# Patient Record
Sex: FEMALE | Race: WHITE | Employment: FULL TIME | URBAN - METROPOLITAN AREA
[De-identification: names, ages, dates, MRNs, and addresses within clinical notes are randomized per-mention and may not be internally consistent; named-entity substitution may affect disease eponyms.]

---

## 2018-05-06 ENCOUNTER — HOSPITAL ENCOUNTER (EMERGENCY)
Age: 28
Discharge: HOME | End: 2018-05-07
Attending: EMERGENCY MEDICINE
Payer: SELF-PAY

## 2018-05-06 ENCOUNTER — APPOINTMENT (OUTPATIENT)
Dept: CT IMAGING | Age: 28
End: 2018-05-06
Attending: EMERGENCY MEDICINE
Payer: SELF-PAY

## 2018-05-06 VITALS
TEMPERATURE: 99.6 F | SYSTOLIC BLOOD PRESSURE: 120 MMHG | HEART RATE: 78 BPM | RESPIRATION RATE: 18 BRPM | DIASTOLIC BLOOD PRESSURE: 69 MMHG | OXYGEN SATURATION: 99 %

## 2018-05-06 DIAGNOSIS — R25.1 TREMOR: ICD-10-CM

## 2018-05-06 DIAGNOSIS — R41.82 ALTERED MENTAL STATUS, UNSPECIFIED ALTERED MENTAL STATUS TYPE: Primary | ICD-10-CM

## 2018-05-06 LAB
AMPHET UR QL SCN: POSITIVE
ANION GAP SERPL CALC-SCNC: 7 MMOL/L (ref 3–18)
APAP SERPL-MCNC: <2 UG/ML (ref 10–30)
APPEARANCE UR: NORMAL
BARBITURATES UR QL SCN: NEGATIVE
BASOPHILS # BLD: 0 K/UL (ref 0–0.1)
BASOPHILS NFR BLD: 0 % (ref 0–2)
BENZODIAZ UR QL: NEGATIVE
BILIRUB UR QL: NEGATIVE
BUN SERPL-MCNC: 10 MG/DL (ref 7–18)
BUN/CREAT SERPL: 15 (ref 12–20)
CALCIUM SERPL-MCNC: 8.5 MG/DL (ref 8.5–10.1)
CANNABINOIDS UR QL SCN: POSITIVE
CHLORIDE SERPL-SCNC: 108 MMOL/L (ref 100–108)
CK MB CFR SERPL CALC: 1.4 % (ref 0–4)
CK MB SERPL-MCNC: 1.4 NG/ML (ref 5–25)
CK SERPL-CCNC: 103 U/L (ref 26–192)
CO2 SERPL-SCNC: 25 MMOL/L (ref 21–32)
COCAINE UR QL SCN: NEGATIVE
COLOR UR: YELLOW
CREAT SERPL-MCNC: 0.65 MG/DL (ref 0.6–1.3)
DIFFERENTIAL METHOD BLD: ABNORMAL
EOSINOPHIL # BLD: 0.1 K/UL (ref 0–0.4)
EOSINOPHIL NFR BLD: 1 % (ref 0–5)
ERYTHROCYTE [DISTWIDTH] IN BLOOD BY AUTOMATED COUNT: 11.5 % (ref 11.6–14.5)
ETHANOL SERPL-MCNC: <3 MG/DL (ref 0–3)
GLUCOSE SERPL-MCNC: 102 MG/DL (ref 74–99)
GLUCOSE UR STRIP.AUTO-MCNC: NEGATIVE MG/DL
HCG UR QL: NEGATIVE
HCT VFR BLD AUTO: 35.6 % (ref 35–45)
HDSCOM,HDSCOM: ABNORMAL
HGB BLD-MCNC: 12.7 G/DL (ref 12–16)
HGB UR QL STRIP: NEGATIVE
KETONES UR QL STRIP.AUTO: NEGATIVE MG/DL
LEUKOCYTE ESTERASE UR QL STRIP.AUTO: NEGATIVE
LYMPHOCYTES # BLD: 2.1 K/UL (ref 0.9–3.6)
LYMPHOCYTES NFR BLD: 20 % (ref 21–52)
MCH RBC QN AUTO: 32.2 PG (ref 24–34)
MCHC RBC AUTO-ENTMCNC: 35.7 G/DL (ref 31–37)
MCV RBC AUTO: 90.4 FL (ref 74–97)
METHADONE UR QL: NEGATIVE
MONOCYTES # BLD: 0.9 K/UL (ref 0.05–1.2)
MONOCYTES NFR BLD: 9 % (ref 3–10)
NEUTS SEG # BLD: 7.6 K/UL (ref 1.8–8)
NEUTS SEG NFR BLD: 70 % (ref 40–73)
NITRITE UR QL STRIP.AUTO: NEGATIVE
OPIATES UR QL: NEGATIVE
PCP UR QL: NEGATIVE
PH UR STRIP: 5 [PH] (ref 5–8)
PLATELET # BLD AUTO: 222 K/UL (ref 135–420)
PMV BLD AUTO: 10 FL (ref 9.2–11.8)
POTASSIUM SERPL-SCNC: 4.2 MMOL/L (ref 3.5–5.5)
PROT UR STRIP-MCNC: NEGATIVE MG/DL
RBC # BLD AUTO: 3.94 M/UL (ref 4.2–5.3)
SALICYLATES SERPL-MCNC: <2.8 MG/DL (ref 2.8–20)
SODIUM SERPL-SCNC: 140 MMOL/L (ref 136–145)
SP GR UR REFRACTOMETRY: 1.02 (ref 1–1.03)
TROPONIN I SERPL-MCNC: <0.02 NG/ML (ref 0–0.04)
UROBILINOGEN UR QL STRIP.AUTO: 0.2 EU/DL (ref 0.2–1)
WBC # BLD AUTO: 10.8 K/UL (ref 4.6–13.2)

## 2018-05-06 PROCEDURE — 80307 DRUG TEST PRSMV CHEM ANLYZR: CPT | Performed by: EMERGENCY MEDICINE

## 2018-05-06 PROCEDURE — 74011250636 HC RX REV CODE- 250/636: Performed by: EMERGENCY MEDICINE

## 2018-05-06 PROCEDURE — 81003 URINALYSIS AUTO W/O SCOPE: CPT | Performed by: EMERGENCY MEDICINE

## 2018-05-06 PROCEDURE — 96374 THER/PROPH/DIAG INJ IV PUSH: CPT

## 2018-05-06 PROCEDURE — 99285 EMERGENCY DEPT VISIT HI MDM: CPT

## 2018-05-06 PROCEDURE — 80048 BASIC METABOLIC PNL TOTAL CA: CPT | Performed by: EMERGENCY MEDICINE

## 2018-05-06 PROCEDURE — 74011250636 HC RX REV CODE- 250/636

## 2018-05-06 PROCEDURE — 85025 COMPLETE CBC W/AUTO DIFF WBC: CPT | Performed by: EMERGENCY MEDICINE

## 2018-05-06 PROCEDURE — 70450 CT HEAD/BRAIN W/O DYE: CPT

## 2018-05-06 PROCEDURE — 93005 ELECTROCARDIOGRAM TRACING: CPT

## 2018-05-06 PROCEDURE — 96361 HYDRATE IV INFUSION ADD-ON: CPT

## 2018-05-06 PROCEDURE — 82550 ASSAY OF CK (CPK): CPT | Performed by: EMERGENCY MEDICINE

## 2018-05-06 PROCEDURE — 81025 URINE PREGNANCY TEST: CPT | Performed by: EMERGENCY MEDICINE

## 2018-05-06 RX ORDER — LORAZEPAM 2 MG/ML
1 INJECTION INTRAMUSCULAR
Status: COMPLETED | OUTPATIENT
Start: 2018-05-06 | End: 2018-05-06

## 2018-05-06 RX ORDER — SODIUM CHLORIDE 9 MG/ML
1000 INJECTION, SOLUTION INTRAVENOUS ONCE
Status: COMPLETED | OUTPATIENT
Start: 2018-05-06 | End: 2018-05-07

## 2018-05-06 RX ORDER — LORAZEPAM 2 MG/ML
INJECTION INTRAMUSCULAR
Status: COMPLETED
Start: 2018-05-06 | End: 2018-05-06

## 2018-05-06 RX ADMIN — LORAZEPAM 1 MG: 2 INJECTION, SOLUTION INTRAMUSCULAR; INTRAVENOUS at 20:35

## 2018-05-06 RX ADMIN — SODIUM CHLORIDE 1000 ML: 900 INJECTION, SOLUTION INTRAVENOUS at 23:19

## 2018-05-06 RX ADMIN — LORAZEPAM 1 MG: 2 INJECTION INTRAMUSCULAR at 20:35

## 2018-05-07 ENCOUNTER — APPOINTMENT (OUTPATIENT)
Dept: GENERAL RADIOLOGY | Age: 28
End: 2018-05-07
Attending: EMERGENCY MEDICINE
Payer: SELF-PAY

## 2018-05-07 LAB
ATRIAL RATE: 68 BPM
CALCULATED P AXIS, ECG09: 32 DEGREES
CALCULATED R AXIS, ECG10: 32 DEGREES
CALCULATED T AXIS, ECG11: 36 DEGREES
DIAGNOSIS, 93000: NORMAL
P-R INTERVAL, ECG05: 110 MS
Q-T INTERVAL, ECG07: 394 MS
QRS DURATION, ECG06: 100 MS
QTC CALCULATION (BEZET), ECG08: 418 MS
VENTRICULAR RATE, ECG03: 68 BPM

## 2018-05-07 PROCEDURE — 74011250637 HC RX REV CODE- 250/637: Performed by: EMERGENCY MEDICINE

## 2018-05-07 PROCEDURE — 71045 X-RAY EXAM CHEST 1 VIEW: CPT

## 2018-05-07 PROCEDURE — 94640 AIRWAY INHALATION TREATMENT: CPT

## 2018-05-07 RX ORDER — ALBUTEROL SULFATE 90 UG/1
1-2 AEROSOL, METERED RESPIRATORY (INHALATION)
Qty: 1 INHALER | Refills: 0 | Status: SHIPPED | OUTPATIENT
Start: 2018-05-07 | End: 2020-01-19

## 2018-05-07 RX ORDER — ALBUTEROL SULFATE 90 UG/1
2 AEROSOL, METERED RESPIRATORY (INHALATION)
Status: COMPLETED | OUTPATIENT
Start: 2018-05-07 | End: 2018-05-07

## 2018-05-07 RX ORDER — LORAZEPAM 0.5 MG/1
0.5 TABLET ORAL
Qty: 10 TAB | Refills: 0 | Status: SHIPPED | OUTPATIENT
Start: 2018-05-07 | End: 2018-09-23

## 2018-05-07 RX ADMIN — ALBUTEROL SULFATE 2 PUFF: 90 AEROSOL, METERED RESPIRATORY (INHALATION) at 00:54

## 2018-05-07 NOTE — ED NOTES
Pt sleeping on stretcher with no acute distress noted. Monitors reapplied. Mother at bedside. Mother expressed no needs at this time. Even rise and fall of pt's chest noted. Will continue to monitor pt and await further orders.

## 2018-05-07 NOTE — ED PROVIDER NOTES
EMERGENCY DEPARTMENT HISTORY AND PHYSICAL EXAM    8:31 PM      Date: 5/6/2018  Patient Name: Bryson Love    History of Presenting Illness     Chief Complaint   Patient presents with    Altered mental status         History Provided By: pt's boyfriend and mother    Chief Complaint: Tremors  Duration:  1 hour  Timing: intermittent  Location: Global  Quality: \"conscious jerking motions\"  Severity: N/a  Modifying Factors: pt takes propanolol and her tremors get worse if she doesn't take it  Associated Symptoms: dizziness      Additional History (Context): Bryson Love is a 28 y.o. female with anxiety who presents with intermittent global tremors for the past hour. Per the pt's boyfriend and mother, the pt complained of swollen lymph nodes, feeling warm, and dizziness prior to the tremors. The tremors were described as \"jerking motions\" by the mother, and she was conscious during the episodes. The pt missed her dose of propanolol. From past experience, if the pt misses a dose her tremors get worse. Per the boyfriend, the pt is not pregnant, doesn't use drugs, and didn't completely pass out during the episodes. Both family members report that within last 15 minutes she has became less coherent. No other concerns, modifying factors, or symptoms were expressed by the pt at this time. PCP: Madiha De La Rosa MD    Current Outpatient Prescriptions   Medication Sig Dispense Refill    albuterol (PROVENTIL HFA, VENTOLIN HFA, PROAIR HFA) 90 mcg/actuation inhaler Take 1-2 Puffs by inhalation every four (4) hours as needed for Wheezing. 1 Inhaler 0    LORazepam (ATIVAN) 0.5 mg tablet Take 1 Tab by mouth every eight (8) hours as needed for Anxiety. Max Daily Amount: 1.5 mg. 10 Tab 0    primidone (MYSOLINE) 50 mg tablet Take 100 mg by mouth nightly.  ondansetron (ZOFRAN ODT) 4 mg disintegrating tablet Take 1 Tab by mouth every eight (8) hours as needed for Nausea.  14 Tab 0    HYDROcodone-acetaminophen (NORCO) 5-325 mg per tablet Take 1-2 Tabs by mouth every six (6) hours as needed for Pain. Max Daily Amount: 8 Tabs. 16 Tab 0    sertraline (ZOLOFT) 100 mg tablet Take 100 mg by mouth daily. Indications: ANXIETY WITH DEPRESSION      dextroamphetamine-amphetamine (ADDERALL) 30 mg tablet Take 15 mg by mouth two (2) times a day. Indications: ATTENTION-DEFICIT HYPERACTIVITY DISORDER      ALPRAZolam (XANAX) 0.5 mg tablet Take 0.5 mg by mouth nightly as needed for Anxiety. Indications: ANXIETY WITH DEPRESSION         Past History     Past Medical History:  History reviewed. No pertinent past medical history. Past Surgical History:  Past Surgical History:   Procedure Laterality Date    HX OOPHORECTOMY         Family History:  History reviewed. No pertinent family history. Social History:  Social History   Substance Use Topics    Smoking status: Never Smoker    Smokeless tobacco: None    Alcohol use No       Allergies:  No Known Allergies      Review of Systems     Review of Systems   Unable to perform ROS: Mental status change         Physical Exam     Visit Vitals    /69    Pulse 78    Temp 99.6 °F (37.6 °C)    Resp 18    SpO2 99%     Physical Exam   Constitutional: She is oriented to person, place, and time. She appears well-developed. HENT:   Head: Normocephalic. Mouth/Throat: Oropharynx is clear and moist.   Eyes: Pupils are equal, round, and reactive to light. Neck: Normal range of motion. Cardiovascular: Normal rate and normal heart sounds. No murmur heard. Pulmonary/Chest: Effort normal. She has no wheezes. She has no rales. Abdominal: Soft. There is no tenderness. Musculoskeletal: Normal range of motion. She exhibits no edema. Neurological: She is alert and oriented to person, place, and time. Pt responsive to sternal rub and a few questions. Pt failed hand drop test, and was verbal during tremor episode after a sternal rub. Skin: Skin is warm and dry.    Nursing note and vitals reviewed. Diagnostic Study Results     Vitals:  No data found. Medications ordered:   Medications   LORazepam (ATIVAN) injection 1 mg (1 mg IntraVENous Given 5/6/18 2035)   0.9% sodium chloride infusion 1,000 mL (0 mL IntraVENous IV Completed 5/7/18 0033)   albuterol (PROVENTIL HFA, VENTOLIN HFA, PROAIR HFA) inhaler 2 Puff (2 Puffs Inhalation Given 5/7/18 0054)         Lab findings:  No results found for this or any previous visit (from the past 12 hour(s)). X-Ray, CT or other radiology findings or impressions:  CT HEAD WO CONT   Final Result   Impression: No acute intracranial pathology. Progress notes, Consult notes or additional Procedure notes:     12:33 AM Consult: I discussed care with Dr. Carla Blandon, Neurology. It was a standard discussion including patient history, chief complaint, available diagnostic results, and predicted treatment course. He agrees with plans to discharge the pt. Agrees sx's not c/w sz's, doesn't rec admit or any further testing at this time. 1:07 AM Progress note: Pt refuses further treatment and feels fine at this time. She requests discharge, says feels much better. Pt and family chronic recurrent sim tremor episodes. No change. Af, nl vitals. To dc per pt request.  No emc found. Pt a and o x 3, declining further ed obs. Pt verb und of det ret inst given. Disposition:  Diagnosis:   1. Altered mental status, unspecified altered mental status type    2.  Tremor        Disposition: Discharged    Follow-up Information     Follow up With Details Comments Contact Info    Tavia Mantilla MD Schedule an appointment as soon as possible for a visit in 2 days  [address] [facility] Schedule an appointment as soon as possible for a visit  Trent Sahu 3  [address]  [phone]           Discharge Medication List as of 5/7/2018 12:48 AM      START taking these medications    Details   albuterol (PROVENTIL HFA, VENTOLIN HFA, PROAIR HFA) 90 mcg/actuation inhaler Take 1-2 Puffs by inhalation every four (4) hours as needed for Wheezing., Print, Disp-1 Inhaler, R-0         CONTINUE these medications which have CHANGED    Details   LORazepam (ATIVAN) 0.5 mg tablet Take 1 Tab by mouth every eight (8) hours as needed for Anxiety. Max Daily Amount: 1.5 mg., Print, Disp-10 Tab, R-0         CONTINUE these medications which have NOT CHANGED    Details   primidone (MYSOLINE) 50 mg tablet Take 100 mg by mouth nightly., Historical Med      ondansetron (ZOFRAN ODT) 4 mg disintegrating tablet Take 1 Tab by mouth every eight (8) hours as needed for Nausea. , Print, Disp-14 Tab, R-0      HYDROcodone-acetaminophen (NORCO) 5-325 mg per tablet Take 1-2 Tabs by mouth every six (6) hours as needed for Pain. Max Daily Amount: 8 Tabs., Print, Disp-16 Tab, R-0      sertraline (ZOLOFT) 100 mg tablet Take 100 mg by mouth daily. Indications: ANXIETY WITH DEPRESSION, Historical Med      dextroamphetamine-amphetamine (ADDERALL) 30 mg tablet Take 15 mg by mouth two (2) times a day. Indications: ATTENTION-DEFICIT HYPERACTIVITY DISORDER, Historical Med      ALPRAZolam (XANAX) 0.5 mg tablet Take 0.5 mg by mouth nightly as needed for Anxiety. Indications: ANXIETY WITH DEPRESSION, Historical Med           Scribe Attestation     Tete Pearce acting as a scribe for and in the presence of Hawk Law MD      May 06, 2018 at 8:31 PM       Provider Attestation:      I personally performed the services described in the documentation, reviewed the documentation, as recorded by the scribe in my presence, and it accurately and completely records my words and actions.  May 06, 2018 at 8:31 PM - Hawk Law MD

## 2018-05-07 NOTE — ED TRIAGE NOTES
Pt arrived with mother and friend at side with reports of a witnessed seizure at home. Pt's mother states about half way here pt became unresponsive and would no longer communicate. Pt has a history of seizures and asthma.

## 2018-05-07 NOTE — ED NOTES
Pt started having jerking like movements with hypersalivation. MD Mayra Tovar notified. Pt able to talk and control arm movement during episode. IV Ativan verbal order given.

## 2018-05-07 NOTE — ED NOTES
Pt resting on stretcher sleeping on and off with mother at bedside. Pt reports limbs still feel heavy but are feeling better.

## 2018-05-07 NOTE — ED NOTES
I have reviewed discharge instructions with patient. The patient verbalized understanding. Patient armband removed and shredded. No acute distress noted at this time, pt alert and oriented. Stable at time of discharge. Vital signs stable. Pt is being discharged with 2 prescriptions at this time. Pt wheeled to lobby stating her limbs still felt very heavy. Pt assisted to vehicle where friend was driving.

## 2018-05-07 NOTE — ED NOTES
Pt placed on bed pan and produced a large amount of urine. Pt states her arms and legs feel to heavy to move.

## 2018-05-07 NOTE — ED NOTES
Pt arrived through triage, family states that she was complaining about her lymphnodes being swollen in her head and neck earlier today, stated she felt dizzy and wanted to lay down, pt went to lay down and family states she became more lethargic and less responsive, about 20 minutes ago pt was alert and oriented, upon arrival pt is arousable but just groans and shakes her head when asked questions at this time.

## 2018-09-07 ENCOUNTER — HOSPITAL ENCOUNTER (EMERGENCY)
Age: 28
Discharge: HOME | End: 2018-09-07
Attending: EMERGENCY MEDICINE
Payer: MEDICAID

## 2018-09-07 VITALS
DIASTOLIC BLOOD PRESSURE: 62 MMHG | SYSTOLIC BLOOD PRESSURE: 110 MMHG | TEMPERATURE: 99.3 F | WEIGHT: 140 LBS | BODY MASS INDEX: 22.5 KG/M2 | OXYGEN SATURATION: 98 % | HEART RATE: 96 BPM | RESPIRATION RATE: 18 BRPM | HEIGHT: 66 IN

## 2018-09-07 DIAGNOSIS — R42 LIGHTHEADEDNESS: ICD-10-CM

## 2018-09-07 DIAGNOSIS — E86.0 DEHYDRATION: Primary | ICD-10-CM

## 2018-09-07 LAB
ALBUMIN SERPL-MCNC: 4.2 G/DL (ref 3.4–5)
ALBUMIN/GLOB SERPL: 1.2 {RATIO} (ref 0.8–1.7)
ALP SERPL-CCNC: 58 U/L (ref 45–117)
ALT SERPL-CCNC: 21 U/L (ref 13–56)
ANION GAP SERPL CALC-SCNC: 9 MMOL/L (ref 3–18)
APPEARANCE UR: CLEAR
AST SERPL-CCNC: 17 U/L (ref 15–37)
BASOPHILS # BLD: 0 K/UL (ref 0–0.1)
BASOPHILS NFR BLD: 0 % (ref 0–2)
BILIRUB SERPL-MCNC: 0.4 MG/DL (ref 0.2–1)
BILIRUB UR QL: NEGATIVE
BUN SERPL-MCNC: 12 MG/DL (ref 7–18)
BUN/CREAT SERPL: 16 (ref 12–20)
CALCIUM SERPL-MCNC: 9.5 MG/DL (ref 8.5–10.1)
CHLORIDE SERPL-SCNC: 103 MMOL/L (ref 100–108)
CO2 SERPL-SCNC: 29 MMOL/L (ref 21–32)
COLOR UR: YELLOW
CREAT SERPL-MCNC: 0.74 MG/DL (ref 0.6–1.3)
DIFFERENTIAL METHOD BLD: ABNORMAL
EOSINOPHIL # BLD: 0.1 K/UL (ref 0–0.4)
EOSINOPHIL NFR BLD: 1 % (ref 0–5)
EPITH CASTS URNS QL MICRO: NORMAL /LPF (ref 0–5)
ERYTHROCYTE [DISTWIDTH] IN BLOOD BY AUTOMATED COUNT: 11.2 % (ref 11.6–14.5)
GLOBULIN SER CALC-MCNC: 3.4 G/DL (ref 2–4)
GLUCOSE SERPL-MCNC: 89 MG/DL (ref 74–99)
GLUCOSE UR STRIP.AUTO-MCNC: NEGATIVE MG/DL
HCG UR QL: NEGATIVE
HCT VFR BLD AUTO: 40 % (ref 35–45)
HGB BLD-MCNC: 13.8 G/DL (ref 12–16)
HGB UR QL STRIP: ABNORMAL
KETONES UR QL STRIP.AUTO: NEGATIVE MG/DL
LEUKOCYTE ESTERASE UR QL STRIP.AUTO: NEGATIVE
LYMPHOCYTES # BLD: 2.8 K/UL (ref 0.9–3.6)
LYMPHOCYTES NFR BLD: 30 % (ref 21–52)
MCH RBC QN AUTO: 33.3 PG (ref 24–34)
MCHC RBC AUTO-ENTMCNC: 34.5 G/DL (ref 31–37)
MCV RBC AUTO: 96.6 FL (ref 74–97)
MONOCYTES # BLD: 0.9 K/UL (ref 0.05–1.2)
MONOCYTES NFR BLD: 10 % (ref 3–10)
NEUTS SEG # BLD: 5.5 K/UL (ref 1.8–8)
NEUTS SEG NFR BLD: 59 % (ref 40–73)
NITRITE UR QL STRIP.AUTO: NEGATIVE
PH UR STRIP: 5 [PH] (ref 5–8)
PLATELET # BLD AUTO: 228 K/UL (ref 135–420)
PMV BLD AUTO: 9.8 FL (ref 9.2–11.8)
POTASSIUM SERPL-SCNC: 3.7 MMOL/L (ref 3.5–5.5)
PROT SERPL-MCNC: 7.6 G/DL (ref 6.4–8.2)
PROT UR STRIP-MCNC: NEGATIVE MG/DL
RBC # BLD AUTO: 4.14 M/UL (ref 4.2–5.3)
RBC #/AREA URNS HPF: NORMAL /HPF (ref 0–5)
SODIUM SERPL-SCNC: 141 MMOL/L (ref 136–145)
SP GR UR REFRACTOMETRY: 1.02 (ref 1–1.03)
UROBILINOGEN UR QL STRIP.AUTO: 0.2 EU/DL (ref 0.2–1)
WBC # BLD AUTO: 9.4 K/UL (ref 4.6–13.2)
WBC URNS QL MICRO: NORMAL /HPF (ref 0–4)

## 2018-09-07 PROCEDURE — 74011250636 HC RX REV CODE- 250/636: Performed by: PHYSICIAN ASSISTANT

## 2018-09-07 PROCEDURE — 96374 THER/PROPH/DIAG INJ IV PUSH: CPT

## 2018-09-07 PROCEDURE — 81001 URINALYSIS AUTO W/SCOPE: CPT | Performed by: PHYSICIAN ASSISTANT

## 2018-09-07 PROCEDURE — 74011250636 HC RX REV CODE- 250/636: Performed by: EMERGENCY MEDICINE

## 2018-09-07 PROCEDURE — 96361 HYDRATE IV INFUSION ADD-ON: CPT

## 2018-09-07 PROCEDURE — 85025 COMPLETE CBC W/AUTO DIFF WBC: CPT | Performed by: PHYSICIAN ASSISTANT

## 2018-09-07 PROCEDURE — 80053 COMPREHEN METABOLIC PANEL: CPT | Performed by: PHYSICIAN ASSISTANT

## 2018-09-07 PROCEDURE — 81025 URINE PREGNANCY TEST: CPT | Performed by: PHYSICIAN ASSISTANT

## 2018-09-07 PROCEDURE — 99283 EMERGENCY DEPT VISIT LOW MDM: CPT

## 2018-09-07 RX ORDER — ONDANSETRON 4 MG/1
4 TABLET, ORALLY DISINTEGRATING ORAL
Qty: 10 TAB | Refills: 0 | Status: SHIPPED | OUTPATIENT
Start: 2018-09-07 | End: 2020-01-19

## 2018-09-07 RX ORDER — ONDANSETRON 2 MG/ML
4 INJECTION INTRAMUSCULAR; INTRAVENOUS
Status: COMPLETED | OUTPATIENT
Start: 2018-09-07 | End: 2018-09-07

## 2018-09-07 RX ORDER — LEVETIRACETAM 250 MG/1
200 TABLET ORAL 2 TIMES DAILY
COMMUNITY
End: 2020-01-19

## 2018-09-07 RX ADMIN — ONDANSETRON 4 MG: 2 INJECTION INTRAMUSCULAR; INTRAVENOUS at 21:58

## 2018-09-07 RX ADMIN — SODIUM CHLORIDE 1000 ML: 900 INJECTION, SOLUTION INTRAVENOUS at 21:05

## 2018-09-07 NOTE — ED NOTES
I performed a brief evaluation, including history and physical, of the patient here in triage and I have determined that pt will need further treatment and evaluation from the main side ER physician. I have placed initial orders to help in expediting patients care. September 07, 2018 at 7:14 PM - Memory JON Suárez Visit Vitals  /79 (BP 1 Location: Left arm, BP Patient Position: At rest)  Pulse 96  Temp 99.3 °F (37.4 °C)  Resp 18  Ht 5' 6\" (1.676 m)  Wt 63.5 kg (140 lb)  SpO2 99%  BMI 22.6 kg/m2

## 2018-09-07 NOTE — ED TRIAGE NOTES
Patient states she has been having diarrhea x 3 days. Feels nauseous and shaky. Feels dehydrated. Has discoloration of bottom of feet (turning yellow) and feels \"confused\"

## 2018-09-08 NOTE — DISCHARGE INSTRUCTIONS
Oral Rehydration: Care Instructions  Your Care Instructions    Dehydration occurs when your body loses too much water. This can happen if you do not drink enough fluids or lose a lot of fluid due to diarrhea, vomiting, or sweating. Being dehydrated can cause health problems and can even be life-threatening. To replace lost fluids, you need to drink liquid that contains special chemicals called electrolytes. Electrolytes keep your body working well. Plain water does not have electrolytes. You also need to rest to prevent more fluid loss. Replacing water and electrolytes (oral rehydration) completely takes about 36 hours. But you should feel better within a few hours. Follow-up care is a key part of your treatment and safety. Be sure to make and go to all appointments, and call your doctor if you are having problems. It's also a good idea to know your test results and keep a list of the medicines you take. How can you care for yourself at home? · Take frequent sips of a drink such as Gatorade, Powerade, or other rehydration drinks that your doctor suggests. These replace both fluid and important chemicals (electrolytes) you need for balance in your blood. · Drink 2 quarts of cool liquid over 2 to 4 hours. You should have at least 10 glasses of liquid a day to replace lost fluid. If you have kidney, heart, or liver disease and have to limit fluids, talk with your doctor before you increase the amount of fluids you drink. · Make your own drink. Measure everything carefully. The drink may not work well or may even be harmful if the amounts are off. ¨ 1 quart water  ¨ ½ teaspoon salt  ¨ 6 teaspoons sugar  · Do not drink liquid with caffeine, such as coffee and usama. · Do not drink any alcohol. It can make you dehydrated. · Drink plenty of fluids, enough so that your urine is light yellow or clear like water.  If you have kidney, heart, or liver disease and have to limit fluids, talk with your doctor before you increase the amount of fluids you drink. When should you call for help? Call 911 anytime you think you may need emergency care. For example, call if:    · You have signs of severe dehydration, such as:  ¨ You are confused or unable to stay awake. ¨ You passed out (lost consciousness).    Call your doctor now or seek immediate medical care if:    · You still have signs of dehydration. You have sunken eyes and a dry mouth, and you pass only a little dark urine.     · You are dizzy or lightheaded, or you feel like you may faint.     · You are not able to keep down fluids.    Watch closely for changes in your health, and be sure to contact your doctor if:    · You do not get better as expected. Where can you learn more? Go to http://corky-chiquita.info/. Enter I040 in the search box to learn more about \"Oral Rehydration: Care Instructions. \"  Current as of: November 20, 2017  Content Version: 11.7  © 0072-2859 [employer]. Care instructions adapted under license by [employer] (which disclaims liability or warranty for this information). If you have questions about a medical condition or this instruction, always ask your healthcare professional. [address] any warranty or liability for your use of this information.

## 2018-09-08 NOTE — ED PROVIDER NOTES
EMERGENCY DEPARTMENT HISTORY AND PHYSICAL EXAM 
 
8:57 PM 
 
 
Date: 9/7/2018 Patient Name: Estefany Unger History of Presenting Illness Chief Complaint Patient presents with  Dehydration  Diarrhea  Nausea  Skin Problem History Provided By: Patient Chief Complaint: Fatigue Duration:  Days Timing:  Gradual and Worsening Location: Generalized Severity: Moderate Modifying Factors: Made worse with continued diarrhea. Associated Symptoms: nausea, vomiting x1, diarrhea, lower abdomial pain, confusion, lightheadedness, myalgias Additional History (Context): Estefany Unger is a 28 y.o. female with a history of asthma, seizures, and oophorectomy, who presents to the ED with complaint of gradually worsening generalized weakness over the past 3 days. Patient states that she has been having persistent diarrhea, nausea, vomiting x1, and lightheadedness over the past three days. She states that she feels dehydrated. Patient reports that her symptoms have progressed and she now feels intermittently confused and has lower abdominal pain and BLE myalgias. She notes that she is a  and has been sweating a lot more than usual. Patient states \"I drink a lot of fluids but I don't think I am getting enough. \" Patient denies recent sick contact. She notes LMP was last week. She denies associated fever, chills, chest pain, shortness of breath, or headache. Patient makes no further complaints. PCP: Shelia Mosquera MD 
 
Current Outpatient Prescriptions Medication Sig Dispense Refill  levETIRAcetam (KEPPRA) 250 mg tablet Take 200 mg by mouth two (2) times a day.  ondansetron (ZOFRAN ODT) 4 mg disintegrating tablet Take 1 Tab by mouth every eight (8) hours as needed for Nausea. 10 Tab 0  
 albuterol (PROVENTIL HFA, VENTOLIN HFA, PROAIR HFA) 90 mcg/actuation inhaler Take 1-2 Puffs by inhalation every four (4) hours as needed for Wheezing.  1 Inhaler 0  
  LORazepam (ATIVAN) 0.5 mg tablet Take 1 Tab by mouth every eight (8) hours as needed for Anxiety. Max Daily Amount: 1.5 mg. 10 Tab 0  primidone (MYSOLINE) 50 mg tablet Take 100 mg by mouth nightly.  HYDROcodone-acetaminophen (NORCO) 5-325 mg per tablet Take 1-2 Tabs by mouth every six (6) hours as needed for Pain. Max Daily Amount: 8 Tabs. 16 Tab 0  
 sertraline (ZOLOFT) 100 mg tablet Take 100 mg by mouth daily. Indications: ANXIETY WITH DEPRESSION    
 dextroamphetamine-amphetamine (ADDERALL) 30 mg tablet Take 15 mg by mouth two (2) times a day. Indications: ATTENTION-DEFICIT HYPERACTIVITY DISORDER  ALPRAZolam (XANAX) 0.5 mg tablet Take 0.5 mg by mouth nightly as needed for Anxiety. Indications: ANXIETY WITH DEPRESSION Past History Past Medical History: 
Past Medical History:  
Diagnosis Date  Asthma  Seizure ([facility] [address] Past Surgical History: 
Past Surgical History:  
Procedure Laterality Date  HX  SECTION    
 HX OOPHORECTOMY Family History: 
History reviewed. No pertinent family history. Social History: 
Social History Substance Use Topics  Smoking status: Never Smoker  Smokeless tobacco: Never Used  Alcohol use No  
 
 
Allergies: 
No Known Allergies Review of Systems Review of Systems Constitutional: Positive for fatigue. Negative for chills and fever. HENT: Negative for congestion, ear pain, rhinorrhea and sore throat. Eyes: Negative for pain, redness and itching. Respiratory: Negative for cough, chest tightness and shortness of breath. Cardiovascular: Negative for chest pain, palpitations and leg swelling. Gastrointestinal: Positive for abdominal pain (lower), diarrhea, nausea and vomiting (x1). Genitourinary: Negative for decreased urine volume, dysuria, flank pain, hematuria and pelvic pain. Musculoskeletal: Negative for arthralgias, back pain, joint swelling and myalgias. Skin: Negative for color change, pallor and rash. Neurological: Positive for light-headedness. Negative for dizziness, weakness and headaches. Hematological: Negative for adenopathy. Does not bruise/bleed easily. Psychiatric/Behavioral: Positive for confusion. Physical Exam  
 
Visit Vitals  /62  Pulse 96  Temp 99.3 °F (37.4 °C)  Resp 18  Ht 5' 6\" (1.676 m)  Wt 63.5 kg (140 lb)  LMP 09/02/2018  SpO2 100%  BMI 22.6 kg/m2 Physical Exam  
Constitutional: No distress. HENT:  
Head: Normocephalic and atraumatic. Mouth/Throat: Oropharynx is clear and moist. Mucous membranes are dry. Eyes: Conjunctivae and EOM are normal. Pupils are equal, round, and reactive to light. Neck: Normal range of motion. Neck supple. Cardiovascular: Normal rate, regular rhythm and normal heart sounds. No murmur heard. Pulmonary/Chest: Effort normal and breath sounds normal. She has no wheezes. She has no rales. Abdominal: Soft. Bowel sounds are normal. She exhibits no distension. There is no tenderness. Musculoskeletal: Normal range of motion. She exhibits no edema or deformity. Lymphadenopathy:  
  She has no cervical adenopathy. Neurological: She is alert. She exhibits normal muscle tone. Coordination normal.  
Skin: Skin is warm and dry. No rash noted. She is not diaphoretic. No erythema. Psychiatric: She has a normal mood and affect. Her behavior is normal.  
 
 
 
Diagnostic Study Results Labs - Recent Results (from the past 12 hour(s)) URINALYSIS W/ RFLX MICROSCOPIC Collection Time: 09/07/18  7:17 PM  
Result Value Ref Range Color YELLOW Appearance CLEAR Specific gravity 1.016 1.005 - 1.030    
 pH (UA) 5.0 5.0 - 8.0 Protein NEGATIVE  NEG mg/dL Glucose NEGATIVE  NEG mg/dL Ketone NEGATIVE  NEG mg/dL Bilirubin NEGATIVE  NEG Blood TRACE (A) NEG Urobilinogen 0.2 0.2 - 1.0 EU/dL  Nitrites NEGATIVE  NEG    
 Leukocyte Esterase NEGATIVE  NEG    
HCG URINE, QL Collection Time: 09/07/18  7:17 PM  
Result Value Ref Range HCG urine, QL NEGATIVE  NEG    
URINE MICROSCOPIC ONLY Collection Time: 09/07/18  7:17 PM  
Result Value Ref Range WBC 0 to 3 0 - 4 /hpf  
 RBC 0 to 3 0 - 5 /hpf Epithelial cells 1+ 0 - 5 /lpf  
CBC WITH AUTOMATED DIFF Collection Time: 09/07/18  8:03 PM  
Result Value Ref Range WBC 9.4 4.6 - 13.2 K/uL  
 RBC 4.14 (L) 4.20 - 5.30 M/uL  
 HGB 13.8 12.0 - 16.0 g/dL HCT 40.0 35.0 - 45.0 % MCV 96.6 74.0 - 97.0 FL  
 MCH 33.3 24.0 - 34.0 PG  
 MCHC 34.5 31.0 - 37.0 g/dL  
 RDW 11.2 (L) 11.6 - 14.5 % PLATELET 133 424 - 799 K/uL MPV 9.8 9.2 - 11.8 FL  
 NEUTROPHILS 59 40 - 73 % LYMPHOCYTES 30 21 - 52 % MONOCYTES 10 3 - 10 % EOSINOPHILS 1 0 - 5 % BASOPHILS 0 0 - 2 %  
 ABS. NEUTROPHILS 5.5 1.8 - 8.0 K/UL  
 ABS. LYMPHOCYTES 2.8 0.9 - 3.6 K/UL  
 ABS. MONOCYTES 0.9 0.05 - 1.2 K/UL  
 ABS. EOSINOPHILS 0.1 0.0 - 0.4 K/UL  
 ABS. BASOPHILS 0.0 0.0 - 0.1 K/UL  
 DF AUTOMATED METABOLIC PANEL, COMPREHENSIVE Collection Time: 09/07/18  8:03 PM  
Result Value Ref Range Sodium 141 136 - 145 mmol/L Potassium 3.7 3.5 - 5.5 mmol/L Chloride 103 100 - 108 mmol/L  
 CO2 29 21 - 32 mmol/L Anion gap 9 3.0 - 18 mmol/L Glucose 89 74 - 99 mg/dL BUN 12 7.0 - 18 MG/DL Creatinine 0.74 0.6 - 1.3 MG/DL  
 BUN/Creatinine ratio 16 12 - 20 GFR est AA >60 >60 ml/min/1.73m2 GFR est non-AA >60 >60 ml/min/1.73m2 Calcium 9.5 8.5 - 10.1 MG/DL Bilirubin, total 0.4 0.2 - 1.0 MG/DL  
 ALT (SGPT) 21 13 - 56 U/L  
 AST (SGOT) 17 15 - 37 U/L Alk. phosphatase 58 45 - 117 U/L Protein, total 7.6 6.4 - 8.2 g/dL Albumin 4.2 3.4 - 5.0 g/dL Globulin 3.4 2.0 - 4.0 g/dL A-G Ratio 1.2 0.8 - 1.7 Radiologic Studies - No orders to display Medical Decision Making I am the first provider for this patient. I reviewed the vital signs, available nursing notes, past medical history, past surgical history, family history and social history. Vital Signs-Reviewed the patient's vital signs. Records Reviewed: Nursing Notes (Time of Review: 8:57 PM) ED Course: Progress Notes, Reevaluation, and Consults: 
 
11:20 PM Patient feeling much better after IVF, tolerating PO fluids, ambulating with steady gait. Will rx antiemetic, primary care f/u. Diagnosis Clinical Impression: 1. Dehydration 2. Lightheadedness Disposition: Discharge Follow-up Information Follow up With Details Comments Contact Info Nicci Hernandez MD In 3 days  [facility] [address] [address] 
[phone] [id number] [facility] EMERGENCY DEPT  If symptoms worsen [address] [id number] [phone] Patient's Medications Start Taking No medications on file Continue Taking ALBUTEROL (PROVENTIL HFA, VENTOLIN HFA, PROAIR HFA) 90 MCG/ACTUATION INHALER    Take 1-2 Puffs by inhalation every four (4) hours as needed for Wheezing. ALPRAZOLAM (XANAX) 0.5 MG TABLET    Take 0.5 mg by mouth nightly as needed for Anxiety. Indications: ANXIETY WITH DEPRESSION  
 DEXTROAMPHETAMINE-AMPHETAMINE (ADDERALL) 30 MG TABLET    Take 15 mg by mouth two (2) times a day. Indications: ATTENTION-DEFICIT HYPERACTIVITY DISORDER HYDROCODONE-ACETAMINOPHEN (NORCO) 5-325 MG PER TABLET    Take 1-2 Tabs by mouth every six (6) hours as needed for Pain. Max Daily Amount: 8 Tabs. LEVETIRACETAM (KEPPRA) 250 MG TABLET    Take 200 mg by mouth two (2) times a day. LORAZEPAM (ATIVAN) 0.5 MG TABLET    Take 1 Tab by mouth every eight (8) hours as needed for Anxiety. Max Daily Amount: 1.5 mg. PRIMIDONE (MYSOLINE) 50 MG TABLET    Take 100 mg by mouth nightly. SERTRALINE (ZOLOFT) 100 MG TABLET    Take 100 mg by mouth daily. Indications: ANXIETY WITH DEPRESSION These Medications have changed Modified Medication Previous Medication ONDANSETRON (ZOFRAN ODT) 4 MG DISINTEGRATING TABLET ondansetron (ZOFRAN ODT) 4 mg disintegrating tablet Take 1 Tab by mouth every eight (8) hours as needed for Nausea. Take 1 Tab by mouth every eight (8) hours as needed for Nausea. Stop Taking No medications on file  
 
_______________________________ Scribe Attestation:    
Arron Alonzo, acting as a scribe for and in the presence of Zulema Hutton MD     
September 07, 2018 at 8:57 PM 
    
Provider Attestation:     
I personally performed the services described in the documentation, reviewed the documentation, as recorded by the scribe in my presence, and it accurately and completely records my words and actions. September 07, 2018 at 8:57 PM - Zulema Hutton MD   
 
_______________________________

## 2018-09-23 ENCOUNTER — HOSPITAL ENCOUNTER (EMERGENCY)
Age: 28
Discharge: HOME | End: 2018-09-24
Attending: EMERGENCY MEDICINE
Payer: COMMERCIAL

## 2018-09-23 ENCOUNTER — APPOINTMENT (OUTPATIENT)
Dept: GENERAL RADIOLOGY | Age: 28
End: 2018-09-23
Attending: NURSE PRACTITIONER
Payer: COMMERCIAL

## 2018-09-23 DIAGNOSIS — F41.1 ANXIETY STATE: Primary | ICD-10-CM

## 2018-09-23 LAB
ALBUMIN SERPL-MCNC: 4.3 G/DL (ref 3.4–5)
ALBUMIN/GLOB SERPL: 1.1 {RATIO} (ref 0.8–1.7)
ALP SERPL-CCNC: 65 U/L (ref 45–117)
ALT SERPL-CCNC: 20 U/L (ref 13–56)
AMPHET UR QL SCN: POSITIVE
ANION GAP SERPL CALC-SCNC: 8 MMOL/L (ref 3–18)
APPEARANCE UR: CLEAR
AST SERPL-CCNC: 12 U/L (ref 15–37)
BACTERIA URNS QL MICRO: NEGATIVE /HPF
BARBITURATES UR QL SCN: NEGATIVE
BASOPHILS # BLD: 0 K/UL (ref 0–0.1)
BASOPHILS NFR BLD: 0 % (ref 0–2)
BENZODIAZ UR QL: NEGATIVE
BILIRUB SERPL-MCNC: 0.6 MG/DL (ref 0.2–1)
BILIRUB UR QL: NEGATIVE
BUN SERPL-MCNC: 11 MG/DL (ref 7–18)
BUN/CREAT SERPL: 17 (ref 12–20)
CALCIUM SERPL-MCNC: 9 MG/DL (ref 8.5–10.1)
CANNABINOIDS UR QL SCN: POSITIVE
CHLORIDE SERPL-SCNC: 107 MMOL/L (ref 100–108)
CO2 SERPL-SCNC: 25 MMOL/L (ref 21–32)
COCAINE UR QL SCN: NEGATIVE
COLOR UR: YELLOW
CREAT SERPL-MCNC: 0.65 MG/DL (ref 0.6–1.3)
DIFFERENTIAL METHOD BLD: ABNORMAL
EOSINOPHIL # BLD: 0.1 K/UL (ref 0–0.4)
EOSINOPHIL NFR BLD: 1 % (ref 0–5)
EPITH CASTS URNS QL MICRO: NORMAL /LPF (ref 0–5)
ERYTHROCYTE [DISTWIDTH] IN BLOOD BY AUTOMATED COUNT: 11.4 % (ref 11.6–14.5)
GLOBULIN SER CALC-MCNC: 4 G/DL (ref 2–4)
GLUCOSE SERPL-MCNC: 87 MG/DL (ref 74–99)
GLUCOSE UR STRIP.AUTO-MCNC: NEGATIVE MG/DL
HCG UR QL: NEGATIVE
HCT VFR BLD AUTO: 43.4 % (ref 35–45)
HDSCOM,HDSCOM: ABNORMAL
HGB BLD-MCNC: 15.3 G/DL (ref 12–16)
HGB UR QL STRIP: NEGATIVE
KETONES UR QL STRIP.AUTO: NEGATIVE MG/DL
LEUKOCYTE ESTERASE UR QL STRIP.AUTO: ABNORMAL
LYMPHOCYTES # BLD: 2.9 K/UL (ref 0.9–3.6)
LYMPHOCYTES NFR BLD: 28 % (ref 21–52)
MAGNESIUM SERPL-MCNC: 2 MG/DL (ref 1.6–2.6)
MCH RBC QN AUTO: 32.8 PG (ref 24–34)
MCHC RBC AUTO-ENTMCNC: 35.3 G/DL (ref 31–37)
MCV RBC AUTO: 93.1 FL (ref 74–97)
METHADONE UR QL: NEGATIVE
MONOCYTES # BLD: 0.9 K/UL (ref 0.05–1.2)
MONOCYTES NFR BLD: 9 % (ref 3–10)
NEUTS SEG # BLD: 6.6 K/UL (ref 1.8–8)
NEUTS SEG NFR BLD: 62 % (ref 40–73)
NITRITE UR QL STRIP.AUTO: NEGATIVE
OPIATES UR QL: NEGATIVE
PCP UR QL: NEGATIVE
PH UR STRIP: 5.5 [PH] (ref 5–8)
PLATELET # BLD AUTO: 238 K/UL (ref 135–420)
PMV BLD AUTO: 10.3 FL (ref 9.2–11.8)
POTASSIUM SERPL-SCNC: 3.5 MMOL/L (ref 3.5–5.5)
PROT SERPL-MCNC: 8.3 G/DL (ref 6.4–8.2)
PROT UR STRIP-MCNC: NEGATIVE MG/DL
RBC # BLD AUTO: 4.66 M/UL (ref 4.2–5.3)
RBC #/AREA URNS HPF: NEGATIVE /HPF (ref 0–5)
SODIUM SERPL-SCNC: 140 MMOL/L (ref 136–145)
SP GR UR REFRACTOMETRY: 1.01 (ref 1–1.03)
UROBILINOGEN UR QL STRIP.AUTO: 0.2 EU/DL (ref 0.2–1)
WBC # BLD AUTO: 11.1 K/UL (ref 4.6–13.2)
WBC URNS QL MICRO: NORMAL /HPF (ref 0–4)

## 2018-09-23 PROCEDURE — 80307 DRUG TEST PRSMV CHEM ANLYZR: CPT | Performed by: NURSE PRACTITIONER

## 2018-09-23 PROCEDURE — 81025 URINE PREGNANCY TEST: CPT | Performed by: NURSE PRACTITIONER

## 2018-09-23 PROCEDURE — 81001 URINALYSIS AUTO W/SCOPE: CPT | Performed by: NURSE PRACTITIONER

## 2018-09-23 PROCEDURE — 85025 COMPLETE CBC W/AUTO DIFF WBC: CPT | Performed by: NURSE PRACTITIONER

## 2018-09-23 PROCEDURE — 83735 ASSAY OF MAGNESIUM: CPT | Performed by: NURSE PRACTITIONER

## 2018-09-23 PROCEDURE — 99283 EMERGENCY DEPT VISIT LOW MDM: CPT

## 2018-09-23 PROCEDURE — 80053 COMPREHEN METABOLIC PANEL: CPT | Performed by: NURSE PRACTITIONER

## 2018-09-23 PROCEDURE — 71045 X-RAY EXAM CHEST 1 VIEW: CPT

## 2018-09-23 RX ORDER — LORAZEPAM 0.5 MG/1
0.5 TABLET ORAL
Qty: 10 TAB | Refills: 0 | Status: SHIPPED | OUTPATIENT
Start: 2018-09-23 | End: 2020-01-19

## 2018-09-23 RX ORDER — LORAZEPAM 2 MG/ML
1 INJECTION INTRAMUSCULAR
Status: DISCONTINUED | OUTPATIENT
Start: 2018-09-23 | End: 2018-09-24 | Stop reason: HOSPADM

## 2018-09-24 NOTE — ED TRIAGE NOTES
Patient brought in by medics who states that patient has anxiety patient is extremely anxious she is awake and alert with rambling speech she has complained of issues with her asthma, her breast her face states that she is waiting on results of an EEG she has also listed a number of other physical ailments in a rambling fashion. Patient admits  that she does has anxiety but states that this is not like her usual attacks, Urine and blood has been sent to the lab and she was given Ativan once her family members got into the room. Patient was asked if she would also like to speak with a mental health counselor once we clear her medically she has refused.

## 2018-09-24 NOTE — DISCHARGE INSTRUCTIONS

## 2019-10-28 NOTE — ED PROVIDER NOTES
EMERGENCY DEPARTMENT HISTORY AND PHYSICAL EXAM 
 
7:48 PM 
 
 
Date: 9/23/2018 Patient Name: Terri Garcia History of Presenting Illness Chief Complaint Patient presents with  Anxiety History Provided By: Patient Chief Complaint: panic attack, muscle aches Duration:  Minutes Timing:  Acute Severity: Moderate Modifying Factors: nothing Associated Symptoms: denies any other associated signs or symptoms Additional History (Context): Terri Garcia is a 28 y.o. female with anxity/panic who presents with a panic reaction/attack. Pt states the she became anxious and called 911. No chest pain no sob reported. She does state a mild cough no fever reported PCP: Bibiana Kerns MD 
 
Current Facility-Administered Medications Medication Dose Route Frequency Provider Last Rate Last Dose  sodium chloride 0.9 % bolus infusion 1,000 mL  1,000 mL IntraVENous ONCE Gonzales Stager, NP      
 sodium chloride 0.9 % bolus infusion 1,000 mL  1,000 mL IntraVENous ONCE Gonzales Stager, NP      
 LORazepam (ATIVAN) injection 1 mg  1 mg IntraVENous NOW Gonzales Stager, NP Current Outpatient Prescriptions Medication Sig Dispense Refill  LORazepam (ATIVAN) 0.5 mg tablet Take 1 Tab by mouth every eight (8) hours as needed for Anxiety. Max Daily Amount: 1.5 mg. 10 Tab 0  
 levETIRAcetam (KEPPRA) 250 mg tablet Take 200 mg by mouth two (2) times a day.  ondansetron (ZOFRAN ODT) 4 mg disintegrating tablet Take 1 Tab by mouth every eight (8) hours as needed for Nausea. 10 Tab 0  
 albuterol (PROVENTIL HFA, VENTOLIN HFA, PROAIR HFA) 90 mcg/actuation inhaler Take 1-2 Puffs by inhalation every four (4) hours as needed for Wheezing. 1 Inhaler 0  
 primidone (MYSOLINE) 50 mg tablet Take 100 mg by mouth nightly.  HYDROcodone-acetaminophen (NORCO) 5-325 mg per tablet Take 1-2 Tabs by mouth every six (6) hours as needed for Pain. Max Daily Amount: 8 Tabs.  16 Tab 0  
  sertraline (ZOLOFT) 100 mg tablet Take 100 mg by mouth daily. Indications: ANXIETY WITH DEPRESSION    
 dextroamphetamine-amphetamine (ADDERALL) 30 mg tablet Take 15 mg by mouth two (2) times a day. Indications: ATTENTION-DEFICIT HYPERACTIVITY DISORDER  ALPRAZolam (XANAX) 0.5 mg tablet Take 0.5 mg by mouth nightly as needed for Anxiety. Indications: ANXIETY WITH DEPRESSION Past History Past Medical History: 
Past Medical History:  
Diagnosis Date  Asthma  Seizure ([facility] [address] Past Surgical History: 
Past Surgical History:  
Procedure Laterality Date  HX  SECTION    
 HX OOPHORECTOMY Family History: No family history on file. Social History: 
Social History Substance Use Topics  Smoking status: Never Smoker  Smokeless tobacco: Never Used  Alcohol use No  
 
 
Allergies: 
No Known Allergies Review of Systems Review of Systems Constitutional: Negative for fever. Respiratory: Negative for chest tightness. Cardiovascular: Negative for chest pain. Neurological: Negative for dizziness. All other systems reviewed and are negative. Physical Exam  
 
Visit Vitals  LMP 2018 Physical Exam  
Constitutional: She is oriented to person, place, and time. She appears well-developed and well-nourished. HENT:  
Head: Normocephalic and atraumatic. Eyes: Conjunctivae and EOM are normal. Pupils are equal, round, and reactive to light. Neck: Normal range of motion. Neck supple. Cardiovascular: Normal rate and regular rhythm. Pulmonary/Chest: Effort normal and breath sounds normal.  
Abdominal: Soft. Bowel sounds are normal.  
Musculoskeletal: Normal range of motion. Neurological: She is alert and oriented to person, place, and time. She has normal reflexes. Skin: Skin is warm and dry.   
Psychiatric: Judgment and thought content normal. Her mood appears anxious. Her speech is rapid and/or pressured. She is agitated. She expresses no homicidal and no suicidal ideation. Nursing note and vitals reviewed. Diagnostic Study Results Labs - Recent Results (from the past 12 hour(s)) CBC WITH AUTOMATED DIFF Collection Time: 09/23/18  7:55 PM  
Result Value Ref Range WBC 11.1 4.6 - 13.2 K/uL  
 RBC 4.66 4.20 - 5.30 M/uL  
 HGB 15.3 12.0 - 16.0 g/dL HCT 43.4 35.0 - 45.0 % MCV 93.1 74.0 - 97.0 FL  
 MCH 32.8 24.0 - 34.0 PG  
 MCHC 35.3 31.0 - 37.0 g/dL  
 RDW 11.4 (L) 11.6 - 14.5 % PLATELET 498 110 - 787 K/uL MPV 10.3 9.2 - 11.8 FL  
 NEUTROPHILS 62 40 - 73 % LYMPHOCYTES 28 21 - 52 % MONOCYTES 9 3 - 10 % EOSINOPHILS 1 0 - 5 % BASOPHILS 0 0 - 2 %  
 ABS. NEUTROPHILS 6.6 1.8 - 8.0 K/UL  
 ABS. LYMPHOCYTES 2.9 0.9 - 3.6 K/UL  
 ABS. MONOCYTES 0.9 0.05 - 1.2 K/UL  
 ABS. EOSINOPHILS 0.1 0.0 - 0.4 K/UL  
 ABS. BASOPHILS 0.0 0.0 - 0.1 K/UL  
 DF AUTOMATED METABOLIC PANEL, COMPREHENSIVE Collection Time: 09/23/18  7:55 PM  
Result Value Ref Range Sodium 140 136 - 145 mmol/L Potassium 3.5 3.5 - 5.5 mmol/L Chloride 107 100 - 108 mmol/L  
 CO2 25 21 - 32 mmol/L Anion gap 8 3.0 - 18 mmol/L Glucose 87 74 - 99 mg/dL BUN 11 7.0 - 18 MG/DL Creatinine 0.65 0.6 - 1.3 MG/DL  
 BUN/Creatinine ratio 17 12 - 20 GFR est AA >60 >60 ml/min/1.73m2 GFR est non-AA >60 >60 ml/min/1.73m2 Calcium 9.0 8.5 - 10.1 MG/DL Bilirubin, total 0.6 0.2 - 1.0 MG/DL  
 ALT (SGPT) 20 13 - 56 U/L  
 AST (SGOT) 12 (L) 15 - 37 U/L Alk. phosphatase 65 45 - 117 U/L Protein, total 8.3 (H) 6.4 - 8.2 g/dL Albumin 4.3 3.4 - 5.0 g/dL Globulin 4.0 2.0 - 4.0 g/dL A-G Ratio 1.1 0.8 - 1.7 MAGNESIUM Collection Time: 09/23/18  7:55 PM  
Result Value Ref Range Magnesium 2.0 1.6 - 2.6 mg/dL URINALYSIS W/ RFLX MICROSCOPIC Collection Time: 09/23/18  8:00 PM  
Result Value Ref Range Color YELLOW  Appearance CLEAR    
 Specific gravity 1.008 1.005 - 1.030    
 pH (UA) 5.5 5.0 - 8.0 Protein NEGATIVE  NEG mg/dL Glucose NEGATIVE  NEG mg/dL Ketone NEGATIVE  NEG mg/dL Bilirubin NEGATIVE  NEG Blood NEGATIVE  NEG Urobilinogen 0.2 0.2 - 1.0 EU/dL Nitrites NEGATIVE  NEG Leukocyte Esterase TRACE (A) NEG    
HCG URINE, QL Collection Time: 09/23/18  8:00 PM  
Result Value Ref Range HCG urine, QL NEGATIVE  NEG    
DRUG SCREEN, URINE Collection Time: 09/23/18  8:00 PM  
Result Value Ref Range BENZODIAZEPINES NEGATIVE  NEG    
 BARBITURATES NEGATIVE  NEG    
 THC (TH-CANNABINOL) POSITIVE (A) NEG    
 OPIATES NEGATIVE  NEG    
 PCP(PHENCYCLIDINE) NEGATIVE  NEG    
 COCAINE NEGATIVE  NEG    
 AMPHETAMINES POSITIVE (A) NEG METHADONE NEGATIVE  NEG HDSCOM (NOTE) URINE MICROSCOPIC ONLY Collection Time: 09/23/18  8:00 PM  
Result Value Ref Range WBC 0 to 2 0 - 4 /hpf  
 RBC NEGATIVE  0 - 5 /hpf Epithelial cells 1+ 0 - 5 /lpf Bacteria NEGATIVE  NEG /hpf Radiologic Studies -  
XR CHEST PORT    (Results Pending) Medical Decision Making I am the first provider for this patient. I reviewed the vital signs, available nursing notes, past medical history, past surgical history, family history and social history. Vital Signs-Reviewed the patient's vital signs. Records Reviewed: Nursing Notes (Time of Review: 7:48 PM) Provider Notes (Medical Decision Making):  
 
Pt treated for anxiety and states she feels better and is ready to go home. Pt given a small rx of ativan and mental health follow up referral.   
 
 
Diagnosis Clinical Impression: 1. Anxiety state Disposition: home Follow-up Information Follow up With Details Comments Contact Info Emmy Brand MD In 2 days  Amada Patrick [id number] [facility] [id number] [phone] Patient's Medications Start Taking LORAZEPAM (ATIVAN) 0.5 MG TABLET    Take 1 Tab by mouth every eight (8) hours as needed for Anxiety. Max Daily Amount: 1.5 mg. Continue Taking ALBUTEROL (PROVENTIL HFA, VENTOLIN HFA, PROAIR HFA) 90 MCG/ACTUATION INHALER    Take 1-2 Puffs by inhalation every four (4) hours as needed for Wheezing. ALPRAZOLAM (XANAX) 0.5 MG TABLET    Take 0.5 mg by mouth nightly as needed for Anxiety. Indications: ANXIETY WITH DEPRESSION  
 DEXTROAMPHETAMINE-AMPHETAMINE (ADDERALL) 30 MG TABLET    Take 15 mg by mouth two (2) times a day. Indications: ATTENTION-DEFICIT HYPERACTIVITY DISORDER HYDROCODONE-ACETAMINOPHEN (NORCO) 5-325 MG PER TABLET    Take 1-2 Tabs by mouth every six (6) hours as needed for Pain. Max Daily Amount: 8 Tabs. LEVETIRACETAM (KEPPRA) 250 MG TABLET    Take 200 mg by mouth two (2) times a day. ONDANSETRON (ZOFRAN ODT) 4 MG DISINTEGRATING TABLET    Take 1 Tab by mouth every eight (8) hours as needed for Nausea. PRIMIDONE (MYSOLINE) 50 MG TABLET    Take 100 mg by mouth nightly. SERTRALINE (ZOLOFT) 100 MG TABLET    Take 100 mg by mouth daily. Indications: ANXIETY WITH DEPRESSION These Medications have changed No medications on file Stop Taking LORAZEPAM (ATIVAN) 0.5 MG TABLET    Take 1 Tab by mouth every eight (8) hours as needed for Anxiety. Max Daily Amount: 1.5 mg.  
 
_______________________________ Attestations: 
Scribe Attestation Samson Raya acting as a scribe for and in the presence of Meghan Gilbert MD     
September 23, 2018 at 7:48 PM 
    
Provider Attestation:     
I personally performed the services described in the documentation, reviewed the documentation, as recorded by the scribe in my presence, and it accurately and completely records my words and actions. September 23, 2018 at 7:48 PM - Meghan Giblert MD   
_______________________________ AMY Cartwright 
 
 normal (ped)...

## 2020-01-19 ENCOUNTER — HOSPITAL ENCOUNTER (EMERGENCY)
Age: 30
Discharge: HOME | End: 2020-01-19
Attending: EMERGENCY MEDICINE
Payer: MEDICAID

## 2020-01-19 VITALS
RESPIRATION RATE: 18 BRPM | SYSTOLIC BLOOD PRESSURE: 113 MMHG | DIASTOLIC BLOOD PRESSURE: 61 MMHG | OXYGEN SATURATION: 100 % | HEART RATE: 75 BPM | TEMPERATURE: 98.5 F

## 2020-01-19 DIAGNOSIS — J03.90 ACUTE TONSILLITIS, UNSPECIFIED ETIOLOGY: Primary | ICD-10-CM

## 2020-01-19 PROCEDURE — 99282 EMERGENCY DEPT VISIT SF MDM: CPT

## 2020-01-19 RX ORDER — PENICILLIN V POTASSIUM 500 MG/1
500 TABLET, FILM COATED ORAL 4 TIMES DAILY
Qty: 28 TAB | Refills: 0 | Status: SHIPPED | OUTPATIENT
Start: 2020-01-19 | End: 2020-01-26

## 2020-01-19 RX ORDER — IBUPROFEN 800 MG/1
800 TABLET ORAL
Qty: 20 TAB | Refills: 0 | Status: SHIPPED | OUTPATIENT
Start: 2020-01-19 | End: 2020-01-26

## 2020-01-19 NOTE — DISCHARGE INSTRUCTIONS
Patient Education        Tonsillitis: Care Instructions  Your Care Instructions    Tonsillitis is an infection of the tonsils that is caused by bacteria or a virus. The tonsils are in the back of the throat and are part of the immune system. Tonsillitis typically lasts from a few days up to a couple of weeks. Tonsillitis caused by a virus goes away on its own. Tonsillitis caused by the bacteria that causes strep throat is treated with antibiotics. You and your doctor may consider surgery to remove the tonsils (tonsillectomy) if you have serious complications or repeat infections. Follow-up care is a key part of your treatment and safety. Be sure to make and go to all appointments, and call your doctor if you are having problems. It's also a good idea to know your test results and keep a list of the medicines you take. How can you care for yourself at home? · If your doctor prescribed antibiotics, take them as directed. Do not stop taking them just because you feel better. You need to take the full course of antibiotics. · Gargle with warm salt water. This helps reduce swelling and relieve discomfort. Gargle once an hour with 1 teaspoon of salt mixed in 8 fluid ounces of warm water. · Take an over-the-counter pain medicine, such as acetaminophen (Tylenol), ibuprofen (Advil, Motrin), or naproxen (Aleve). Be safe with medicines. Read and follow all instructions on the label. No one younger than 20 should take aspirin. It has been linked to Reye syndrome, a serious illness. · Be careful when taking over-the-counter cold or flu medicines and Tylenol at the same time. Many of these medicines have acetaminophen, which is Tylenol. Read the labels to make sure that you are not taking more than the recommended dose. Too much acetaminophen (Tylenol) can be harmful. · Try an over-the-counter throat spray to relieve throat pain. · Drink plenty of fluids. Fluids may help soothe an irritated throat.  Drink warm or cool liquids (whichever feels better). These include tea, soup, and juice. · Do not smoke, and avoid secondhand smoke. Smoking can make tonsillitis worse. If you need help quitting, talk to your doctor about stop-smoking programs and medicines. These can increase your chances of quitting for good. · Use a vaporizer or humidifier to add moisture to your bedroom. Follow the directions for cleaning the machine. When should you call for help? Call your doctor now or seek immediate medical care if:    · Your pain gets worse on one side of your throat.     · You have a new or higher fever.     · You notice changes in your voice.     · You have trouble opening your mouth.     · You have any trouble breathing.     · You have much more trouble swallowing.     · You have a fever with a stiff neck or a severe headache.     · You are sensitive to light or feel very sleepy or confused.    Watch closely for changes in your health, and be sure to contact your doctor if:    · You do not get better after 2 days. Where can you learn more? Go to http://corky-chiquita.info/. Enter P382 in the search box to learn more about \"Tonsillitis: Care Instructions. \"  Current as of: October 21, 2018  Content Version: 12.2  © 9708-4927 [employer]. Care instructions adapted under license by [employer] (which disclaims liability or warranty for this information). If you have questions about a medical condition or this instruction, always ask your healthcare professional. [address] any warranty or liability for your use of this information. Elevaate Activation    Thank you for requesting access to Elevaate. Please follow the instructions below to securely access and download your online medical record. Elevaate allows you to send messages to your doctor, view your test results, renew your prescriptions, schedule appointments, and more. How Do I Sign Up? 1.  In your internet browser, go to [email or web address]  2. Click on the First Time User? Click Here link in the Sign In box. You will be redirect to the New Member Sign Up page. 3. Enter your Garena Access Code exactly as it appears below. You will not need to use this code after youve completed the sign-up process. If you do not sign up before the expiration date, you must request a new code. Garena Access Code: [email or web address]  Expires: 3/4/2020 12:17 PM (This is the date your Garena access code will )    4. Enter the last four digits of your Social Security Number (xxxx) and Date of Birth (mm/dd/yyyy) as indicated and click Submit. You will be taken to the next sign-up page. 5. Create a Garena ID. This will be your Garena login ID and cannot be changed, so think of one that is secure and easy to remember. 6. Create a Garena password. You can change your password at any time. 7. Enter your Password Reset Question and Answer. This can be used at a later time if you forget your password. 8. Enter your e-mail address. You will receive e-mail notification when new information is available in [address] 9. Click Sign Up. You can now view and download portions of your medical record. 10. Click the Download Summary menu link to download a portable copy of your medical information. Additional Information    If you have questions, please visit the Frequently Asked Questions section of the Garena website at [email or web address] Remember, Garena is NOT to be used for urgent needs. For medical emergencies, dial 911. Complete all medications as prescribed. Follow-up with primary care doctor in 1 week. Return to the ED immediately for any new or worsening symptoms.

## 2020-01-19 NOTE — ED PROVIDER NOTES
EMERGENCY DEPARTMENT HISTORY AND PHYSICAL EXAM    Date: 2020  Patient Name: Irineo New    History of Presenting Illness     Chief Complaint   Patient presents with    Sore Throat         History Provided By patient     Chief Complaint: sore throat and ear pain   Duration:2 days  Timing: acute  Location: throat, L ear  Quality:aching  Severity moderate  Modifying Factors: none  Associated Symptoms: ear pain and sore throat       Additional History (Context): Irineo New is a 26 y.o. female with last medical history asthma and seizures who presents with complaints of 2 days of a sore throat and left ear pain. Patient denies taking any medications for symptoms prior to arrival.  Denies any known sick exposures. Denies any chance of pregnancy. No other complaints reported at this time. PCP: Mandie Dow MD        Past History     Past Medical History:  Past Medical History:   Diagnosis Date    Asthma     Seizure [facility])        Past Surgical History:  Past Surgical History:   Procedure Laterality Date    HX  SECTION      HX OOPHORECTOMY         Family History:  History reviewed. No pertinent family history. Social History:  Social History     Tobacco Use    Smoking status: Never Smoker    Smokeless tobacco: Never Used   Substance Use Topics    Alcohol use: No    Drug use: No       Allergies:  No Known Allergies      Review of Systems   Review of Systems   Constitutional: Negative. Negative for chills and fever. HENT: Positive for ear pain and sore throat. Negative for congestion and rhinorrhea. Eyes: Negative. Negative for pain and redness. Respiratory: Negative. Negative for cough, shortness of breath, wheezing and stridor. Cardiovascular: Negative. Negative for chest pain and leg swelling. Gastrointestinal: Negative. Negative for abdominal pain, constipation, diarrhea, nausea and vomiting. Genitourinary: Negative. Negative for dysuria and frequency. Musculoskeletal: Negative. Negative for back pain and neck pain. Skin: Negative. Negative for rash and wound. Neurological: Negative. Negative for dizziness, seizures, syncope and headaches. All other systems reviewed and are negative. All Other Systems Negative  Physical Exam     Vitals:    01/19/20 1220   BP: 113/61   Pulse: 75   Resp: 18   Temp: 98.5 °F (36.9 °C)   SpO2: 100%     Physical Exam  Vitals signs and nursing note reviewed. Constitutional:       General: She is not in acute distress. Appearance: She is well-developed. She is not diaphoretic. HENT:      Head: Normocephalic and atraumatic. Right Ear: Tympanic membrane, ear canal and external ear normal. There is no impacted cerumen. Left Ear: Tympanic membrane, ear canal and external ear normal. There is no impacted cerumen. Nose: Nose normal. No congestion or rhinorrhea. Mouth/Throat:      Mouth: Mucous membranes are moist.      Pharynx: Oropharyngeal exudate and posterior oropharyngeal erythema present. Comments: Tonsils enlarged and erythematous with bilateral exudates. Mallampati 3. Airway is patent. No trismus, drooling, or uvular deviation noted. Able to clear secretions. Eyes:      General: No scleral icterus. Right eye: No discharge. Left eye: No discharge. Conjunctiva/sclera: Conjunctivae normal.   Neck:      Musculoskeletal: Normal range of motion and neck supple. Cardiovascular:      Rate and Rhythm: Normal rate and regular rhythm. Heart sounds: Normal heart sounds. No murmur. No friction rub. No gallop. Pulmonary:      Effort: Pulmonary effort is normal. No respiratory distress. Breath sounds: Normal breath sounds. No stridor. No wheezing or rales. Musculoskeletal: Normal range of motion. Skin:     General: Skin is warm and dry. Findings: No erythema or rash. Neurological:      Mental Status: She is alert and oriented to person, place, and time. Coordination: Coordination normal.      Comments: Gait is steady and patient exhibits no evidence of ataxia. Patient is able to ambulate without difficulty. No focal neurological deficit noted. No facial droop, slurred speech, or evidence of altered mentation noted on exam.     Psychiatric:         Behavior: Behavior normal.         Thought Content: Thought content normal.              Diagnostic Study Results     Labs -   No results found for this or any previous visit (from the past 12 hour(s)). Radiologic Studies -   No orders to display     CT Results  (Last 48 hours)    None        CXR Results  (Last 48 hours)    None            Medical Decision Making   I am the first provider for this patient. I reviewed the vital signs, available nursing notes, past medical history, past surgical history, family history and social history. Vital Signs-Reviewed the patient's vital signs. Records Reviewed: Simran Kumari PA-C     Procedures:  Procedures    Provider Notes (Medical Decision Making): Impression:  Acute tonsillitis    Clinical presentation suggestive of acute tonsillitis. Will plan to d/c with pen VK, motrin, and chloraseptic spray with pcp follow-up. Pt agrees with this plan. Simran Kumari PA-C     MED RECONCILIATION:  No current facility-administered medications for this encounter. Current Outpatient Medications   Medication Sig    penicillin v potassium (VEETID) 500 mg tablet Take 1 Tab by mouth four (4) times daily for 7 days.  ibuprofen (MOTRIN) 800 mg tablet Take 1 Tab by mouth every six (6) hours as needed for Pain for up to 7 days.  phenol-glycerin (CHLORASEPTIC MAX SORE THROAT) 1.5-33 % spry 1 Spray by Mucous Membrane route every two (2) hours as needed for Pain. Disposition:  D/c    DISCHARGE NOTE:   Patient is stable for discharge at this time. I have discussed all the findings from today's work up with the patient, including lab results and imaging.  I have answered all questions. Rx for pen VK, motrin, and chloraseptic spray given. Rest and close follow-up with the PCP recommended this week. Return to the ED immediately for any new or worsening symptoms. Simran Lockhart PA-C     Follow-up Information     Follow up With Specialties Details Why Contact Info    Gracie Kirby MD Family Practice Schedule an appointment as soon as possible for a visit in 1 week  [address] [facility]  [phone] [address] [facility] EMERGENCY DEPT Emergency Medicine  As needed, If symptoms worsen [address] [facility]  [phone]          Current Discharge Medication List      START taking these medications    Details   penicillin v potassium (VEETID) 500 mg tablet Take 1 Tab by mouth four (4) times daily for 7 days. Qty: 28 Tab, Refills: 0      ibuprofen (MOTRIN) 800 mg tablet Take 1 Tab by mouth every six (6) hours as needed for Pain for up to 7 days. Qty: 20 Tab, Refills: 0      phenol-glycerin (CHLORASEPTIC MAX SORE THROAT) 1.5-33 % spry 1 Spray by Mucous Membrane route every two (2) hours as needed for Pain. Qty: 30 mL, Refills: 0               Diagnosis     Clinical Impression:   1.  Acute tonsillitis, unspecified etiology

## 2020-07-10 ENCOUNTER — HOSPITAL ENCOUNTER (EMERGENCY)
Age: 30
Discharge: HOME | End: 2020-07-10
Attending: EMERGENCY MEDICINE
Payer: MEDICAID

## 2020-07-10 VITALS
SYSTOLIC BLOOD PRESSURE: 110 MMHG | HEART RATE: 89 BPM | HEIGHT: 67 IN | BODY MASS INDEX: 24.33 KG/M2 | DIASTOLIC BLOOD PRESSURE: 65 MMHG | OXYGEN SATURATION: 99 % | WEIGHT: 155 LBS | RESPIRATION RATE: 16 BRPM | TEMPERATURE: 98.6 F

## 2020-07-10 DIAGNOSIS — K04.7 DENTAL ABSCESS: ICD-10-CM

## 2020-07-10 DIAGNOSIS — K08.89 PAIN, DENTAL: ICD-10-CM

## 2020-07-10 DIAGNOSIS — K02.9 DENTAL DECAY: Primary | ICD-10-CM

## 2020-07-10 PROCEDURE — 99282 EMERGENCY DEPT VISIT SF MDM: CPT

## 2020-07-10 RX ORDER — IBUPROFEN 600 MG/1
600 TABLET ORAL
Qty: 12 TAB | Refills: 0 | Status: SHIPPED | OUTPATIENT
Start: 2020-07-10 | End: 2020-07-15

## 2020-07-10 RX ORDER — CHLORHEXIDINE GLUCONATE 1.2 MG/ML
10 RINSE ORAL EVERY 12 HOURS
Qty: 280 ML | Refills: 0 | Status: SHIPPED | OUTPATIENT
Start: 2020-07-10 | End: 2020-07-24

## 2020-07-10 RX ORDER — HYDROCODONE BITARTRATE AND ACETAMINOPHEN 5; 325 MG/1; MG/1
1 TABLET ORAL
Qty: 10 TAB | Refills: 0 | Status: SHIPPED | OUTPATIENT
Start: 2020-07-10 | End: 2020-07-13

## 2020-07-10 RX ORDER — PENICILLIN V POTASSIUM 500 MG/1
500 TABLET, FILM COATED ORAL 4 TIMES DAILY
Qty: 28 TAB | Refills: 0 | Status: SHIPPED | OUTPATIENT
Start: 2020-07-10 | End: 2020-07-17

## 2020-07-10 NOTE — ED PROVIDER NOTES
EMERGENCY DEPARTMENT HISTORY AND PHYSICAL EXAM    1:35  PM      Date: 7/10/2020  Patient Name: Tamie Solorzano    History of Presenting Illness     Chief Complaint   Patient presents with    Dental Pain         History Provided By: Patient    Additional History (Context): Tamie Solorzano is a 26 y.o. female with asthma who presents with complaint of ear upper dental pain for the past 4 days. She states that she has a dental disease and severe decay, and states her mother has the same dental disease and decay. .  She does not currently have a dentist.  Fever, difficulty swallowing, neck pain, nausea, no other complaint. LMP     PCP: Marcie Gay MD        Past History     Past Medical History:  Past Medical History:   Diagnosis Date    Asthma     Seizure [facility])        Past Surgical History:  Past Surgical History:   Procedure Laterality Date    HX  SECTION      HX OOPHORECTOMY         Family History:  No family history on file. Social History:  Social History     Tobacco Use    Smoking status: Never Smoker    Smokeless tobacco: Never Used   Substance Use Topics    Alcohol use: No    Drug use: No       Allergies:  No Known Allergies      Review of Systems       Review of Systems   Constitutional: Negative for chills and fever. HENT: Positive for dental problem (Left upper frontal area). Negative for congestion, drooling, rhinorrhea, sore throat and trouble swallowing. Left-sided facial pain   Eyes: Negative. Respiratory: Negative for cough, shortness of breath and wheezing. Cardiovascular: Negative for chest pain. Gastrointestinal: Negative for abdominal pain, nausea and vomiting. Endocrine: Negative. Genitourinary: Negative. Musculoskeletal: Negative for arthralgias and neck stiffness. Skin: Negative for pallor and rash. Neurological: Negative for dizziness, weakness, numbness and headaches. Hematological: Does not bruise/bleed easily. Psychiatric/Behavioral: Negative for confusion and dysphoric mood. All other systems reviewed and are negative. Physical Exam     Visit Vitals  /65 (BP 1 Location: Right arm, BP Patient Position: At rest)   Pulse 89   Temp 98.6 °F (37 °C)   Resp 16   Ht 5' 7\" (1.702 m)   Wt 70.3 kg (155 lb)   LMP 06/16/2020   SpO2 99%   BMI 24.28 kg/m²         Physical Exam  Vitals signs and nursing note reviewed. Constitutional:       General: She is not in acute distress. Appearance: She is well-developed. She is not diaphoretic. HENT:      Head: Normocephalic and atraumatic. Nose: Nose normal.      Mouth/Throat:      Mouth: Mucous membranes are moist.      Pharynx: No oropharyngeal exudate. Comments: Significant dental decay and multiple teeth  Tender to tap teeth numbers 11 and 12  Tender to palpate the gum in that area but no obvious gumline abscess  Uvula is midline  Eyes:      General: No scleral icterus. Conjunctiva/sclera: Conjunctivae normal.      Pupils: Pupils are equal, round, and reactive to light. Neck:      Musculoskeletal: Normal range of motion and neck supple. No muscular tenderness. Cardiovascular:      Rate and Rhythm: Normal rate. Pulses: Normal pulses. Comments: Capillary refill < 3 seconds  Pulmonary:      Effort: Pulmonary effort is normal. No respiratory distress. Breath sounds: Normal breath sounds. Musculoskeletal: Normal range of motion. Lymphadenopathy:      Cervical: No cervical adenopathy. Skin:     General: Skin is warm and dry. Coloration: Skin is not jaundiced or pale. Neurological:      Mental Status: She is alert and oriented to person, place, and time. Cranial Nerves: No cranial nerve deficit. Coordination: Coordination normal.   Psychiatric:         Thought Content:  Thought content normal.           Diagnostic Study Results     Labs -  No results found for this or any previous visit (from the past 12 hour(s)). Radiologic Studies -   No orders to display         Medical Decision Making   I am the first provider for this patient. I reviewed the vital signs, available nursing notes, past medical history, past surgical history, family history and social history. Vital Signs-Reviewed the patient's vital signs. Records Reviewed: Nursing Notes and Old Medical Records (Time of Review: 2:24 PM)    Provider Notes (Medical Decision Making):    MDM    Medications - No data to display        ED Course: Progress Notes, Reevaluation, and Consults:  She with severe dental decay and likely dental abscess    Gave list of dentists for her to follow-up    I have reassessed the patient. I have discussed the workup, results and plan with the patient and patient is in agreement. Patient will be prescribed Peridex, penicillin VK, Norco, Motrin. Patient was discharge in stable condition. Patient was given outpatient follow up. Patient is to return to emergency department if any new or worsening condition. Diagnosis     Clinical Impression:   1. Dental decay    2. Dental abscess    3. Pain, dental        Disposition: Discharged    Follow-up Information     Follow up With Specialties Details Why Contact Info    [address]  Schedule an appointment as soon as possible for a visit in 3 days  [phone]  [facility] Emergency Medicine  As needed, If symptoms worsen [address] [facility]  [phone]           Patient's Medications   Start Taking    CHLORHEXIDINE (PERIDEX) 0.12 % SOLUTION    10 mL by Swish and Spit route every twelve (12) hours for 14 days. Indications: mouth infection prevention    HYDROCODONE-ACETAMINOPHEN (NORCO) 5-325 MG PER TABLET    Take 1 Tab by mouth every six (6) hours as needed for Pain for up to 3 days. Max Daily Amount: 4 Tabs.     IBUPROFEN (MOTRIN) 600 MG TABLET    Take 1 Tab by mouth every eight (8) hours as needed (Pain, fever) for up to 5 days. PENICILLIN V POTASSIUM (VEETID) 500 MG TABLET    Take 1 Tab by mouth four (4) times daily for 7 days. Indications: Dental infection   Continue Taking    PHENOL-GLYCERIN (CHLORASEPTIC MAX SORE THROAT) 1.5-33 % SPRY    1 Spray by Mucous Membrane route every two (2) hours as needed for Pain. These Medications have changed    No medications on file   Stop Taking    No medications on file         DO Liliam Singer medical dictation software was used for portions of this report. Unintended transcription errors may occur. My signature above authenticates this document and my orders, the final    diagnosis (es), discharge prescription (s), and instructions in the Epic    record.

## 2020-07-10 NOTE — ED NOTES
Assumed care of pt for d/c only. I have reviewed discharge instructions with the patient. The patient verbalized understanding. Medication teaching given, to include name, dose, action, and side effects. Patient verbalized understanding of medications. Encouraged patient to voice any concerns with reassurance provided. Instructed not to drive or use heavy machinery while taking this medication. Patient states they have someone to drive them home. Patient armband removed and shredded    Patient Discharged in stable condition.

## 2020-07-10 NOTE — DISCHARGE INSTRUCTIONS
Patient Education        Please follow-up with one of the dental clinics listed below:    [address] [facility] [phone]  [facility] [phone]  [facility] [phone] (For Extractions Only)  [address] [facility] [phone]  [facility] [phone] [address] [phone]    Call to schedule an appointment. Tooth Decay: Care Instructions  Your Care Instructions    Tooth decay is damage to a tooth caused by plaque. Plaque is a thin film of bacteria that sticks to the teeth above and below the gum line. If plaque isn't removed from the teeth, it can build up and harden into tartar. The bacteria in plaque and tartar use sugars in food to make acids. These acids can cause tooth decay and gum disease. Any part of your tooth can decay, from the roots below the gum line to the chewing surface. Decay can affect the outer layer (enamel) or inner layer (dentin) of your teeth. The deeper the decay, the worse the damage. Untreated tooth decay will get worse and may lead to tooth loss. If you have a small hole (cavity) in your tooth, your dentist can repair it by removing the decay and filling the hole. If you have deeper decay, you may need more treatment. A very badly damaged tooth may have to be removed. Follow-up care is a key part of your treatment and safety. Be sure to make and go to all appointments, and call your dentist if you are having problems. It's also a good idea to know your test results and keep a list of the medicines you take. How can you care for yourself at home? If you have pain:  · Take an over-the-counter pain medicine, such as acetaminophen (Tylenol), ibuprofen (Advil, Motrin), or naproxen (Aleve). Be safe with medicines. Read and follow all instructions on the label. ? Do not take two or more pain medicines at the same time unless the doctor told you to. Many pain medicines have acetaminophen, which is Tylenol.  Too much acetaminophen (Tylenol) can be harmful. · Put ice or a cold pack on your cheek over the tooth for 10 to 15 minutes at a time. Put a thin cloth between the ice and your skin. To prevent tooth decay  · Brush teeth twice a day, and floss once a day. Brushing with fluoride toothpaste and flossing may be enough to reverse early decay. · Use a toothbrush with soft, rounded-end bristles and a head that is small enough to reach all parts of your teeth and mouth. Replace your toothbrush every 3 or 4 months. You may also use an electric toothbrush that has rotating and oscillating (back-and-forth) action. · Ask your dentist about having fluoride treatments at the dental office. · Brush your tongue to help get rid of bacteria. · Eat healthy foods that include whole grains, vegetables, and fruits. · Have your teeth cleaned by a professional at least two times a year. · Do not smoke or use smokeless tobacco. Tobacco can make tooth decay worse. When should you call for help? [id number] anytime you think you may need emergency care. For example, call if:  · You have trouble breathing. Call your dentist now or seek immediate medical care if:  · You have new or worse symptoms of infection, such as:  ? Increased pain, swelling, warmth, or redness. ? Red streaks leading from the area. ? Pus draining from the area. ? A fever. Watch closely for changes in your health, and be sure to contact your doctor if:  · You do not get better as expected. Where can you learn more? Go to http://www.[employer].com/  Enter F057 in the search box to learn more about \"Tooth Decay: Care Instructions. \"  Current as of: March 25, 2020               Content Version: 12.5  © 7238-4081 [employer]. Care instructions adapted under license by [employer] (which disclaims liability or warranty for this information).  If you have questions about a medical condition or this instruction, always ask your healthcare professional. [place] [address] any warranty or liability for your use of this information. Patient Education        Abscessed Tooth: Care Instructions  Your Care Instructions     An abscessed tooth is a tooth that has a pocket of pus in the tissues around it. Pus forms when the body tries to fight an infection caused by bacteria. If the pus cannot drain, it forms an abscess. An abscessed tooth can cause red, swollen gums and throbbing pain, especially when you chew. You may have a bad taste in your mouth and a fever, and your jaw may swell. Damage to the tooth, untreated tooth decay, or gum disease can cause an abscessed tooth. An abscessed tooth needs to be treated by a dental professional right away. If it is not treated, the infection could spread to other parts of your body. Your dentist will give you antibiotics to stop the infection. He or she may make a hole in the tooth or cut open (tan) the abscess inside your mouth so that the infection can drain, which should relieve your pain. You may need to have a root canal treatment, which tries to save your tooth by taking out the infected pulp and replacing it with a healing medicine and/or a filling. If these treatments do not work, your tooth may have to be removed. Follow-up care is a key part of your treatment and safety. Be sure to make and go to all appointments, and call your doctor if you are having problems. It's also a good idea to know your test results and keep a list of the medicines you take. How can you care for yourself at home? · Reduce pain and swelling in your face and jaw by putting ice or a cold pack on the outside of your cheek. Do this for 10 to 20 minutes at a time. Put a thin cloth between the ice and your skin. · Take pain medicines exactly as directed. ? If the doctor gave you a prescription medicine for pain, take it as prescribed.   ? If you are not taking a prescription pain medicine, ask your doctor if you can take an over-the-counter medicine. · Take antibiotics as directed. Do not stop taking them just because you feel better. You need to take the full course of antibiotics. To prevent tooth abscess  · Brush and floss every day. Have regular dental checkups. · Eat a healthy diet. Avoid sugary foods and drinks. · Do not smoke or vape with nicotine. And don't use spit tobacco. Tobacco and nicotine slow your ability to heal. They increase your risk for gum disease and cancer of the mouth and throat. If you need help quitting, talk to your doctor about stop-smoking programs and medicines. These can increase your chances of quitting for good. When should you call for help? [id number] anytime you think you may need emergency care. For example, call if:  · You have trouble breathing. Call your doctor now or seek immediate medical care if:  · You have new or worse symptoms of infection, such as:  ? Increased pain, swelling, warmth, or redness. ? Red streaks leading from the area. ? Pus draining from the area. ? A fever. Watch closely for changes in your health, and be sure to contact your doctor if:  · You do not get better as expected. Where can you learn more? Go to http://corky-chiquita.info/  Enter L466 in the search box to learn more about \"Abscessed Tooth: Care Instructions. \"  Current as of: March 25, 2020               Content Version: 12.5  © 0169-2066 [employer]. Care instructions adapted under license by [employer] (which disclaims liability or warranty for this information). If you have questions about a medical condition or this instruction, always ask your healthcare professional. [place] [address] any warranty or liability for your use of this information.

## 2021-01-06 ENCOUNTER — OFFICE VISIT (OUTPATIENT)
Dept: ORTHOPEDIC SURGERY | Age: 31
End: 2021-01-06
Payer: MEDICAID

## 2021-01-06 VITALS
WEIGHT: 153 LBS | HEART RATE: 96 BPM | OXYGEN SATURATION: 97 % | BODY MASS INDEX: 24.59 KG/M2 | SYSTOLIC BLOOD PRESSURE: 113 MMHG | DIASTOLIC BLOOD PRESSURE: 70 MMHG | TEMPERATURE: 97.3 F | HEIGHT: 66 IN

## 2021-01-06 DIAGNOSIS — M25.562 LEFT KNEE PAIN, UNSPECIFIED CHRONICITY: Primary | ICD-10-CM

## 2021-01-06 DIAGNOSIS — M25.662 DECREASED RANGE OF MOTION OF LEFT KNEE: ICD-10-CM

## 2021-01-06 DIAGNOSIS — S83.412A SPRAIN OF MEDIAL COLLATERAL LIGAMENT OF LEFT KNEE, INITIAL ENCOUNTER: ICD-10-CM

## 2021-01-06 DIAGNOSIS — M23.8X2 CREPITUS OF JOINT OF LEFT KNEE: ICD-10-CM

## 2021-01-06 DIAGNOSIS — M17.12 ARTHRITIS OF KNEE, LEFT: ICD-10-CM
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
## 2021-01-06 DIAGNOSIS — M25.462 EFFUSION OF LEFT KNEE: ICD-10-CM

## 2021-01-06 PROCEDURE — 73562 X-RAY EXAM OF KNEE 3: CPT | Performed by: PHYSICIAN ASSISTANT

## 2021-01-06 PROCEDURE — 99203 OFFICE O/P NEW LOW 30 MIN: CPT | Performed by: PHYSICIAN ASSISTANT

## 2021-01-06 RX ORDER — DICLOFENAC SODIUM 75 MG/1
75 TABLET, DELAYED RELEASE ORAL 2 TIMES DAILY
Qty: 60 TAB | Refills: 0 | Status: SHIPPED | OUTPATIENT
Start: 2021-01-06 | End: 2021-02-03 | Stop reason: SDUPTHER

## 2021-01-06 NOTE — LETTER
2021 4:17 PM 
 
Ms. Harvinder Elkins 
[address] [facility] [address] To whom it may concern: The above patient is currently being treated for a left knee injury. Please accommodate her work hours to allow below: 
 
1. Sit to stand and stand to sit as needed. 2.  No extended standing or walking 3. No squatting or crawling 4. No ladder or scaffolding climbing Recommendations  30 days from the date of this note.  
 
 
 
 
Sincerely, 
 
 
Maurice Larkin PA-C

## 2021-01-06 NOTE — PROGRESS NOTES
Patient: Danielle Edouard                MRN: [id number]       SSN: [id number]  YOB: 1990        AGE: 31 y.o. SEX: female          PCP: Citlali Chinchilla MD  01/06/21    Chief Complaint   Patient presents with    Knee Pain     left       HISTORY:  Danielle Edouard is a 31 y.o. female presents to the office status post 2 falls with the first dating back to the first part of December 2020 and the second fall dating back to just after Christmas 2020. There was no loss of consciousness at the time of either fall. She reports no history of pain in the left knee or prior trauma. Both falls were accidental.  Patient is attempted to manage her symptoms with over-the-counter Tylenol and Motrin with minimal success. She feels like as she is walking her kneecap is slipping out of place. 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 She stands from a seated position and sits from a standing position often during the course of the day. Associated with transitioning from the aforementioned positions occasionally she develops a locking sensation of the left knee particularly on the inside which stops her in her motion and results in discomfort which is lasting a couple minutes. Pain at rest associated with the left knee dull aching characteristic carrying a pain rating of 3-4 on a 10 point scale. With aforementioned activity and on occasion without warning pain increases to upwards of a 7 8 on a 10 point scale.     Pain Assessment  1/6/2021   Location of Pain Knee   Location Modifiers Left   Severity of Pain 8   Quality of Pain Sharp   Duration of Pain Persistent   Frequency of Pain Constant   Aggravating Factors Bending;Stretching   Limiting Behavior Yes   Relieving Factors Rest   Result of Injury Yes   Work-Related Injury No   Type of Injury Fall           No results found for: HBA1C, HGBE8, CAM5RCQS, YEV7YWDX  Weight Metrics 2021 7/10/2020 2019 2018 2016 8/15/2016 2016   Weight 153 lb 155 lb 135 lb 140 lb 140 lb 145 lb 145 lb   BMI 24.69 kg/m2 24.28 kg/m2 21.79 kg/m2 22.6 kg/m2 27.34 kg/m2 24.13 kg/m2 23.41 kg/m2            Problem List Items Addressed This Visit     None      Visit Diagnoses     Left knee pain, unspecified chronicity    -  Primary    Relevant Orders    AMB POC X-RAY KNEE 3 VIEW    Arthritis of knee, left        Decreased range of motion of left knee        Crepitus of joint of left knee        Effusion of left knee              PAST MEDICAL HISTORY:   Past Medical History:   Diagnosis Date    Asthma     Seizure ([facility] [address]        PAST SURGICAL HISTORY:   Past Surgical History:   Procedure Laterality Date    HX  SECTION      HX OOPHORECTOMY         ALLERGIES: No Known Allergies     CURRENT MEDICATIONS:  A list of medications prior to the time of admission include:  Prior to Admission medications    Medication Sig Start Date End Date Taking? Authorizing Provider   phenol-glycerin (CHLORASEPTIC MAX SORE THROAT) 1.5-33 % spry 1 Spray by Mucous Membrane route every two (2) hours as needed for Pain. 20   Simran Kumari PA-C       FAMILY HISTORY: No family history on file. SOCIAL HISTORY:   Social History     Socioeconomic History    Marital status: SINGLE     Spouse name: Not on file    Number of children: Not on file    Years of education: Not on file    Highest education level: Not on file   Tobacco Use    Smoking status: Never Smoker    Smokeless tobacco: Never Used   Substance and Sexual Activity    Alcohol use: No    Drug use: No    Sexual activity: Yes     Partners: Male     Birth control/protection: Condom, I.U.D. ROS:No CP, No SOB, No fever/chills nor night sweats. No headaches, vision abnormalities to include double and oral loss of vision. No hearing abnormalities. Musculoskeletal pain per HPI. Pain is exacerbated positionally.   Pt denies h/o spinal surgery, injections, or PT/chiropractor. Self treated with less than adequate relief on oral antiinflammatories. . Pt denies change in bowel or bladder habits. Pt denies fever, weight loss, or skin changes. EXAM:  Patient alert and oriented x 3,   CN II-XII grossly intact  Sitting comfortably in the exam room, interacting with conversation with pleasant affect. Breathing appears regular effortless with no visible usage of accessory muscles  Distal cap refill intact at 2/2 Christopher UE / LE. Neuro intact Christopher UE/LE to noxious stimuli    Ortho Specific exam:    Left knee reveals skin intact. No warmth, erythema, edema, ecchymosis, effusion. While supine with a chaperone present [facility] RTR left knee examined to reveal full extension with a mild varus deformity. There is pain noted on palpation at the origin and the insertion of the MCL. LCL is nontender. No laxity in the joint on medial or lateral stressing however varus stressing does increase medial joint line discomfort. While supine patella does track midline without subluxation with a range tested today of 105 degrees -0. With patient in full extension her left patella does lateralize slightly more so than the right patella. There is a positive Rahda's sign. X-ray: [facility] 1/6/2021 space 3 view of the left knee reflects early osteoarthritis tricompartmental more prominent over the lateral joint space but also noted patellofemoral.  There is a bony osteophyte seen in lateral imaging as well as AP just proximal to the fibular head. No soft tissue calcifications noted. IMPRESSION:      ICD-10-CM ICD-9-CM    1. Left knee pain, unspecified chronicity  M25.562 719.46 AMB POC X-RAY KNEE 3 VIEW   2. Arthritis of knee, left  M17.12 716.96    3. Decreased range of motion of left knee  M25.662 719.56    4. Crepitus of joint of left knee  M23.8X2 719.66    5. Effusion of left knee  M25.462 719.06    6.   MCL strain  7. Contusion left knee secondary to fall  8. Status post fall      PLAN: Today we discussed at length options for care to include but not limited to conservative bracing for period of several weeks with a prescription for Voltaren enteric-coated tablets to be taken 1 p.o. twice a day with food. No surgical intervention necessary at this time. I am concerned that she does have some laxity of the patella and her passive and active ranging and there could be underlying medial retinacular injury. A MRI may be necessary in the future to fully assess for that and the underlying possibility of a meniscal injury with her Radah sign noting positive today. Patient provided a reminder for a \"due or due soon\" health maintenance. I have asked the patient to schedule an appointment with their primary care provider for follow-up on general health maintenance concerns. Today all the patient's questions were answered to their satisfaction. Copies of x-rays reviewed if obtained this visit, and provided to patient. Dictation disclaimer:  Please note that this dictation was completed with [employer], the computer voice recognition software. Quite often unanticipated grammatical, syntax, homophones, and other interpretive errors are inadvertently transcribed by the computer software. Please disregard these errors. Please excuse any errors that have escaped final proofreading. Hugo GARCIA, APC, MPAS, PA-C  [facility]

## 2021-02-03 ENCOUNTER — OFFICE VISIT (OUTPATIENT)
Dept: ORTHOPEDIC SURGERY | Age: 31
End: 2021-02-03
Payer: MEDICAID

## 2021-02-03 VITALS
WEIGHT: 150 LBS | DIASTOLIC BLOOD PRESSURE: 77 MMHG | BODY MASS INDEX: 24.11 KG/M2 | HEIGHT: 66 IN | RESPIRATION RATE: 16 BRPM | HEART RATE: 89 BPM | TEMPERATURE: 98.4 F | OXYGEN SATURATION: 100 % | SYSTOLIC BLOOD PRESSURE: 123 MMHG

## 2021-02-03 DIAGNOSIS — M25.562 LEFT KNEE PAIN, UNSPECIFIED CHRONICITY: ICD-10-CM

## 2021-02-03 DIAGNOSIS — S83.412A SPRAIN OF MEDIAL COLLATERAL LIGAMENT OF LEFT KNEE, INITIAL ENCOUNTER: Primary | ICD-10-CM

## 2021-02-03 PROCEDURE — 99212 OFFICE O/P EST SF 10 MIN: CPT | Performed by: PHYSICIAN ASSISTANT

## 2021-02-03 RX ORDER — DICLOFENAC SODIUM 75 MG/1
75 TABLET, DELAYED RELEASE ORAL 2 TIMES DAILY
Qty: 60 TAB | Refills: 0 | Status: SHIPPED | OUTPATIENT
Start: 2021-02-03

## 2021-02-03 NOTE — LETTER
2/3/2021 9:50 AM 
 
Ms. Dangelo Gregory 
[address] [facility] [address] To whom it may concern: 
 
Please note the above patient is to continue her current restrictions for an additional 2 weeks. She was reexamined on 2/3/2021. Thank you Sincerely, 
 
 
Dominique Smith PA-C

## 2021-02-03 NOTE — PROGRESS NOTES
Patient: Khloe Vaughan                MRN: [id number]       SSN: [id number]  YOB: 1990        AGE: 31 y.o. SEX: female          PCP: Aniya Hooker MD  02/03/21    2/3/2021: Patient returns for follow-up regarding her left knee pain she was diagnosed with a MCL sprain previously. She is wearing her neoprene range of motion brace which has helped her symptoms. She is also been taking her Voltaren enteric-coated tablets which she believes has been beneficial for symptom relief as well. She is been full weightbearing of her left lower extremity and does have work modifications which are currently in play noting that she may stand to sit or sit to stand to comfort. At home she has been discontinuing use of the brace and walking full weightbearing without any difficulties regarding the left knee. Chief Complaint   Patient presents with    Knee Pain     left knee pain       HISTORY:  Khloe Vaughan is a 31 y.o. female presents to the office status post 2 falls with the first dating back to the first part of December 2020 and the second fall dating back to just after Christmas 2020. There was no loss of consciousness at the time of either fall. She reports no history of pain in the left knee or prior trauma. Both falls were accidental.  Patient is attempted to manage her symptoms with over-the-counter Tylenol and Motrin with minimal success. She feels like as she is walking her kneecap is slipping out of place. 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 She stands from a seated position and sits from a standing position often during the course of the day.   Associated with transitioning from the aforementioned positions occasionally she develops a locking sensation of the left knee particularly on the inside which stops her in her motion and results in discomfort which is lasting a couple minutes. Pain at rest associated with the left knee dull aching characteristic carrying a pain rating of 3-4 on a 10 point scale. With aforementioned activity and on occasion without warning pain increases to upwards of a 7 8 on a 10 point scale. Pain Assessment  2/3/2021   Location of Pain Knee   Location Modifiers Left   Severity of Pain 2   Quality of Pain Burning   Duration of Pain -   Frequency of Pain Intermittent   Aggravating Factors Walking;Standing   Limiting Behavior No   Relieving Factors NSAID;Rest   Result of Injury No   Work-Related Injury -   Type of Injury -           No results found for: HBA1C, HGBE8, FGI0TSML, HJN2MJFP, LCD9OQCT  Weight Metrics 2/3/2021 2021 7/10/2020 2019 2018 2016 8/15/2016   Weight 150 lb 153 lb 155 lb 135 lb 140 lb 140 lb 145 lb   BMI 24.21 kg/m2 24.69 kg/m2 24.28 kg/m2 21.79 kg/m2 22.6 kg/m2 27.34 kg/m2 24.13 kg/m2            Problem List Items Addressed This Visit     None      Visit Diagnoses     Sprain of medial collateral ligament of left knee, initial encounter    -  Primary    Left knee pain, unspecified chronicity              PAST MEDICAL HISTORY:   Past Medical History:   Diagnosis Date    Asthma     Seizure ([facility] [address]        PAST SURGICAL HISTORY:   Past Surgical History:   Procedure Laterality Date    HX  SECTION      HX OOPHORECTOMY         ALLERGIES: No Known Allergies     CURRENT MEDICATIONS:  A list of medications prior to the time of admission include:  Prior to Admission medications    Medication Sig Start Date End Date Taking? Authorizing Provider   diclofenac EC (VOLTAREN) 75 mg EC tablet Take 1 Tab by mouth two (2) times a day. 2/3/21  Yes Marisa Martinez PA-C   phenol-glycerin (CHLORASEPTIC MAX SORE THROAT) 1.5-33 % spry 1 Spray by Mucous Membrane route every two (2) hours as needed for Pain. 20  Yes Simran Kumari PA-C   diclofenac EC (VOLTAREN) 75 mg EC tablet Take 1 Tab by mouth two (2) times a day.  21 2/3/21  Brillhart, Henrietta Curling, PA-C       FAMILY HISTORY: No family history on file. SOCIAL HISTORY:   Social History     Socioeconomic History    Marital status: SINGLE     Spouse name: Not on file    Number of children: Not on file    Years of education: Not on file    Highest education level: Not on file   Tobacco Use    Smoking status: Never Smoker    Smokeless tobacco: Never Used   Substance and Sexual Activity    Alcohol use: No    Drug use: No    Sexual activity: Yes     Partners: Male     Birth control/protection: Condom, I.U.D. ROS:No CP, No SOB, No fever/chills nor night sweats. No headaches, vision abnormalities to include double and oral loss of vision. No hearing abnormalities. Musculoskeletal pain per HPI. Pain is exacerbated positionally. Pt denies h/o spinal surgery, injections, or PT/chiropractor. Self treated with less than adequate relief on oral antiinflammatories. . Pt denies change in bowel or bladder habits. Pt denies fever, weight loss, or skin changes. EXAM:  Patient alert and oriented x 3,   CN II-XII grossly intact  Sitting comfortably in the exam room, interacting with conversation with pleasant affect. Breathing appears regular effortless with no visible usage of accessory muscles  Distal cap refill intact at 2/2 Christopher UE / LE. Neuro intact Christopher UE/LE to noxious stimuli    Ortho Specific exam:    Left knee reveals skin intact. No warmth, erythema, edema, ecchymosis, effusion. There is no pain noted on palpation at the origin and the insertion of the MCL. LCL is nontender. No laxity in the joint on medial or lateral stressing however varus stressing does slightly cause medial joint line discomfort. While supine patella does track midline without subluxation with a range tested today of 105 degrees -0. With patient in full extension her left patella does lateralize slightly more so than the right patella.      There is a positive Radha's sign.        [facility] view of the left knee reflects early osteoarthritis tricompartmental more prominent over the lateral joint space but also noted patellofemoral.  There is a bony osteophyte seen in lateral imaging as well as AP just proximal to the fibular head. No soft tissue calcifications noted. IMPRESSION:      ICD-10-CM ICD-9-CM    1. Sprain of medial collateral ligament of left knee, initial encounter  S83.412A 844.1    2. Left knee pain, unspecified chronicity  M25.562 719.46    6. MCL strain  7. Contusion left knee secondary to fall  8. Status post fall      PLAN: Today we discussed at length options for care to include but not limited to continuing conservative bracing for period of several weeks with a prescription for Voltaren enteric-coated tablets to be taken 1 p.o. twice a day with food. No MRI is necessary at this time. Patient was refilled of her Voltaren. She was also given a work note indicating an additional 2 weeks of limitations as previous. She will follow up as needed. Today all of her questions answered to her satisfaction. Patient provided a reminder for a \"due or due soon\" health maintenance. I have asked the patient to schedule an appointment with their primary care provider for follow-up on general health maintenance concerns. Today all the patient's questions were answered to their satisfaction. Copies of x-rays reviewed if obtained this visit, and provided to patient. Dictation disclaimer:  Please note that this dictation was completed with [employer], the computer voice recognition software. Quite often unanticipated grammatical, syntax, homophones, and other interpretive errors are inadvertently transcribed by the computer software. Please disregard these errors. Please excuse any errors that have escaped final proofreading. Rubia Martinez  APA, APC, MPAS, PA-C  [facility]

## 2022-07-11 NOTE — DISCHARGE INSTRUCTIONS
----- Message from Joel Gary [address] [employer]    [phone]; OK to leave message on voicemail  ---------------------------------------------------------------------------  -------------- Return for pain, fever, shortness of breath, vomiting, decreased fluid intake, weakness, numbness, dizziness, or any change or concerns. Altered Mental Status: Care Instructions  Your Care Instructions    Altered mental status is a change in how well your brain is working. As a result, you may be confused, be less alert than usual, or act in odd ways. This may include seeing or hearing things that aren't really there (hallucinations). A mental status change has many possible causes. For example, it may be the result of an infection, an imbalance of chemicals in the body, or a chronic disease such as diabetes or COPD. It can also be caused by things such as a head injury, taking certain medicines, or using alcohol or drugs. The doctor may do tests to look for the cause. These tests may include urine tests, blood tests, and imaging tests such as a CT scan. Sometimes a clear cause isn't found. But tests can help the doctor rule out a serious cause of your symptoms. A change in mental status can be scary. But mental status will often return to normal when the cause is treated. So it is important to get any follow-up testing or treatment the doctor has suggested. The doctor has checked you carefully, but problems can develop later. If you notice any problems or new symptoms, get medical treatment right away. Follow-up care is a key part of your treatment and safety. Be sure to make and go to all appointments, and call your doctor if you are having problems. It's also a good idea to know your test results and keep a list of the medicines you take. How can you care for yourself at home? · Be safe with medicines. Take your medicines exactly as prescribed. Call your doctor if you think you are having a problem with your medicine. · Have another adult stay with you until you are better. This can help keep you safe. Ask that person to watch for signs that your mental status is getting worse.   When should you call for help? Call 911 anytime you think you may need emergency care. For example, call if:  ? · You passed out (lost consciousness). ?Call your doctor now or seek immediate medical care if:  ? · Your mental status is getting worse. ? · You have new symptoms, such as a fever, chills, or shortness of breath. ? · You do not feel safe. ? Watch closely for changes in your health, and be sure to contact your doctor if:  ? · You do not get better as expected. Where can you learn more? Go to http://corky-chiquita.info/. Enter V998 in the search box to learn more about \"Altered Mental Status: Care Instructions. \"  Current as of: October 14, 2016  Content Version: 11.4  © 3807-6620 [employer]. Care instructions adapted under license by [employer] (which disclaims liability or warranty for this information). If you have questions about a medical condition or this instruction, always ask your healthcare professional. [address] any warranty or liability for your use of this information.